# Patient Record
Sex: MALE | Race: BLACK OR AFRICAN AMERICAN | NOT HISPANIC OR LATINO | Employment: UNEMPLOYED | URBAN - METROPOLITAN AREA
[De-identification: names, ages, dates, MRNs, and addresses within clinical notes are randomized per-mention and may not be internally consistent; named-entity substitution may affect disease eponyms.]

---

## 2021-09-28 ENCOUNTER — OFFICE VISIT (OUTPATIENT)
Dept: URGENT CARE | Age: 66
End: 2021-09-28
Payer: COMMERCIAL

## 2021-09-28 VITALS
TEMPERATURE: 97.7 F | DIASTOLIC BLOOD PRESSURE: 94 MMHG | WEIGHT: 235 LBS | HEART RATE: 55 BPM | RESPIRATION RATE: 18 BRPM | BODY MASS INDEX: 30.16 KG/M2 | HEIGHT: 74 IN | OXYGEN SATURATION: 99 % | SYSTOLIC BLOOD PRESSURE: 169 MMHG

## 2021-09-28 DIAGNOSIS — S91.341A: Primary | ICD-10-CM

## 2021-09-28 PROCEDURE — 10120 INC&RMVL FB SUBQ TISS SMPL: CPT | Performed by: NURSE PRACTITIONER

## 2021-09-28 PROCEDURE — G0382 LEV 3 HOSP TYPE B ED VISIT: HCPCS | Performed by: NURSE PRACTITIONER

## 2021-09-28 RX ORDER — QUETIAPINE FUMARATE 25 MG/1
25 TABLET, FILM COATED ORAL
COMMUNITY

## 2021-09-28 RX ORDER — VENLAFAXINE 75 MG/1
75 TABLET ORAL 2 TIMES DAILY
COMMUNITY

## 2021-09-28 RX ORDER — SULFAMETHOXAZOLE AND TRIMETHOPRIM 800; 160 MG/1; MG/1
1 TABLET ORAL EVERY 12 HOURS SCHEDULED
Qty: 14 TABLET | Refills: 0 | Status: SHIPPED | OUTPATIENT
Start: 2021-09-28 | End: 2021-10-05

## 2021-09-28 RX ORDER — DULOXETIN HYDROCHLORIDE 20 MG/1
20 CAPSULE, DELAYED RELEASE ORAL DAILY
COMMUNITY

## 2021-09-28 RX ORDER — CHLORTHALIDONE 25 MG/1
12.5 TABLET ORAL DAILY
COMMUNITY
Start: 2021-04-19 | End: 2022-04-19

## 2021-09-28 RX ORDER — NAPROXEN 375 MG/1
375 TABLET ORAL
COMMUNITY

## 2021-09-28 NOTE — PATIENT INSTRUCTIONS
Acute Wound Care   AMBULATORY CARE:   An acute wound  is an injury that causes a break in the skin  An acute wound can happen suddenly, last a short time, and may heal on its own  Common signs and symptoms of an acute wound:   · A cut, tear, or gash in your skin    · Bleeding, swelling, pain, or trouble moving the affected area    · Dirt or foreign objects inside the wound     · Milky, yellow, green, or brown pus in the wound     · Red, tender, or warm area around the pus    · Fever  Seek care immediately if:   · You have pus or a foul odor coming from the wound  · You have sudden trouble breathing or chest pain  · Blood soaks through your bandage  Contact your healthcare provider if:   · You have muscle, joint, or body aches, sweating, or a fever  · You have more swelling, redness, or bleeding in your wound  · Your skin is itchy, swollen, or you have a rash  · You have questions or concerns about your condition or care  Treatment for an acute wound  may include any of the following:  · Cleansing  is done with soap and water to wash away germs and decrease the risk of infection  Sterile water further cleans the wound  The cleaning is done under high pressure with a catheter tip and large syringe  A solution that kills germs may also be used  · Debridement  is done to clean and remove objects, dirt, or dead tissues from the open wound  Healthcare providers may also drain the wound to clean out pus  · Closure of the wound  is done with stitches, staples, skin adhesive, or other treatments  This may be done if the wound is wide or deep  Stitches may be needed if the wound is in an area that moves a lot, such as the hands, feet, and joints  Stitches may help to keep the wound from getting infected  They may also decrease the amount of scarring you have  Some wounds may heal better without stitches    Wound care:   · If your wound was closed with thin strips of medical tape, keep them clean and Problem: Patient Care Overview  Goal: Plan of Care Review  Outcome: Ongoing (interventions implemented as appropriate)  Pt to go to SNF tomorrow. No reported pain. Pt alerts staff if needing to use the restroom. Sinus/ Sinus bradycardia on the monitor. No noted distress.       dry  The strips of medical tape will fall off on their own  Do not pull them off  · Keep the bandage clean and dry  Do not remove the bandage over your wound unless your healthcare provider says it is okay  · Wash your hands before and after you take care of your wound to prevent infection  · Clean the wound as directed  If you cannot reach the wound, have someone help you  · If you have packing, make sure all the gauze used to pack the wound is taken out and replaced as directed  Keep track of how many gauze dressings are placed inside the wound  Follow up with your healthcare provider as directed:  Write down your questions so you remember to ask them during your visits  © 2016 [employer] is for End User's use only and may not be sold, redistributed or otherwise used for commercial purposes  All illustrations and images included in CareNotes® are the copyrighted property of A D A M [employer]  or Samy Oconnell  The above information is an  only  It is not intended as medical advice for individual conditions or treatments  Talk to your doctor, nurse or pharmacist before following any medical regimen to see if it is safe and effective for you

## 2022-04-06 ENCOUNTER — OFFICE VISIT (OUTPATIENT)
Dept: URGENT CARE | Age: 67
End: 2022-04-06
Payer: COMMERCIAL

## 2022-04-06 VITALS
RESPIRATION RATE: 18 BRPM | TEMPERATURE: 97 F | SYSTOLIC BLOOD PRESSURE: 146 MMHG | HEART RATE: 57 BPM | DIASTOLIC BLOOD PRESSURE: 83 MMHG | OXYGEN SATURATION: 99 %

## 2022-04-06 DIAGNOSIS — T14.8XXA SPLINTER IN SKIN: Primary | ICD-10-CM

## 2022-04-06 PROCEDURE — G0382 LEV 3 HOSP TYPE B ED VISIT: HCPCS

## 2022-04-06 RX ORDER — CEPHALEXIN 500 MG/1
500 CAPSULE ORAL EVERY 6 HOURS SCHEDULED
Qty: 28 CAPSULE | Refills: 0 | Status: SHIPPED | OUTPATIENT
Start: 2022-04-06 | End: 2022-04-13

## 2022-04-06 NOTE — PATIENT INSTRUCTIONS
Abscess   WHAT YOU NEED TO KNOW:   A warm compress may help your abscess drain  Your healthcare provider may make a cut in the abscess so it can drain  You may need surgery to remove an abscess that is on your hands or buttocks  DISCHARGE INSTRUCTIONS:   Return to the emergency department if:   · The area around your abscess becomes very painful, warm, or has red streaks  · You have a fever and chills  · Your heart is beating faster than usual     · You feel faint or confused  Call your doctor if:   · Your abscess gets bigger or does not get better  · Your abscess returns  · You have questions or concerns about your condition or care  Medicines: You may  need any of the following:  · Antibiotics  help treat a bacterial infection  · Acetaminophen  decreases pain and fever  It is available without a doctor's order  Ask how much to take and how often to take it  Follow directions  Read the labels of all other medicines you are using to see if they also contain acetaminophen, or ask your doctor or pharmacist  Acetaminophen can cause liver damage if not taken correctly  Do not use more than 4 grams (4,000 milligrams) total of acetaminophen in one day  · NSAIDs , such as ibuprofen, help decrease swelling, pain, and fever  This medicine is available with or without a doctor's order  NSAIDs can cause stomach bleeding or kidney problems in certain people  If you take blood thinner medicine, always ask your healthcare provider if NSAIDs are safe for you  Always read the medicine label and follow directions  · Take your medicine as directed  Contact your healthcare provider if you think your medicine is not helping or if you have side effects  Tell him or her if you are allergic to any medicine  Keep a list of the medicines, vitamins, and herbs you take  Include the amounts, and when and why you take them  Bring the list or the pill bottles to follow-up visits   Carry your medicine list with you in case of an emergency  Self-care:   · Apply a warm compress to your abscess  This will help it open and drain  Wet a washcloth in warm, but not hot, water  Apply the compress for 10 minutes  Repeat this 4 times each day  Do not  press on an abscess or try to open it with a needle  You may push the bacteria deeper or into your blood  · Do not share your clothes, towels, or sheets with anyone  This can spread the infection to others  · Wash your hands often  This can help prevent the spread of germs  Use soap and water or an alcohol-based hand rub  Care for your wound after it is drained:   · Care for your wound as directed  If your healthcare provider says it is okay, carefully remove the bandage and gauze packing  You may need to soak the gauze to get it out of your wound  Clean your wound and the area around it as directed  Dry the area and put on new, clean bandages  Change your bandages when they get wet or dirty  · Ask your healthcare provider how to change the gauze in your wound  Keep track of how many pieces of gauze are placed inside the wound  Do not put too much packing in the wound  Do not pack the gauze too tightly in your wound  Follow up with your healthcare provider in 1 to 3 days: You may need to have your packing removed or your bandage changed  Write down your questions so you remember to ask them during your visits  © [employer] 2022 Information is for End User's use only and may not be sold, redistributed or otherwise used for commercial purposes  All illustrations and images included in CareNotes® are the copyrighted property of [employer]  or [facility] Marissa Paul   The above information is an  only  It is not intended as medical advice for individual conditions or treatments  Talk to your doctor, nurse or pharmacist before following any medical regimen to see if it is safe and effective for you

## 2022-04-06 NOTE — PROGRESS NOTES
[facility]        NAME: Madi Puckett is a 61 y o  male  : 1955    MRN: [id number]  DATE: 2022  TIME: 9:43 AM    Assessment and Plan   Splinter in skin [T14  8XXA]  1  Splinter in skin  Transfer to other facility    cephalexin (KEFLEX) 500 mg capsule   Imbedded splinter in right palm  With gentle pressure, purulent drainage was able to be expressed, however no splinter was identified  At this point, will refer to emergency department for further evaluation and I&D of abscess if needed  Seven-day course of Keflex ordered  Patient Instructions   Report to [facility] emergency department for further evaluation  Follow up with PCP in 3-5 days  Proceed to  ER if symptoms worsen  Chief Complaint     Chief Complaint   Patient presents with    Foreign Body in Skin     right hand          History of Present Illness       Patient is a 61 y o  male who presents for chief complaint of imbedded splinter in his right palm  No significant PMH  He reports that about 2 days ago he was sanding a banister, and got a splinter in his right hand  He feels it may have broken off at the tip, as he did not see the tip  He assumed it would work its way out but yesterday noticed some swelling of the area and decided to have it evaluated  Denies fever, drainage, numbness/tingling  Tdap is UTD  Review of Systems   Review of Systems   Constitutional: Negative for chills, fatigue and fever  HENT: Negative for ear pain, sinus pressure, sinus pain and sore throat  Eyes: Negative for pain and visual disturbance  Respiratory: Negative for cough and shortness of breath  Cardiovascular: Negative for chest pain and palpitations  Gastrointestinal: Negative for abdominal pain, diarrhea, nausea and vomiting  Endocrine: Negative  Genitourinary: Negative for dysuria and hematuria  Musculoskeletal: Negative for arthralgias and back pain  Skin: Positive for wound   Negative for color change and rash  Right palm   Allergic/Immunologic: Negative  Neurological: Negative for dizziness, seizures, syncope, light-headedness, numbness and headaches  Hematological: Negative  Psychiatric/Behavioral: Negative  All other systems reviewed and are negative  Current Medications       Current Outpatient Medications:     cephalexin (KEFLEX) 500 mg capsule, Take 1 capsule (500 mg total) by mouth every 6 (six) hours for 7 days, Disp: 28 capsule, Rfl: 0    chlorthalidone 25 mg tablet, Take 12 5 mg by mouth daily, Disp: , Rfl:     Cholecalciferol 50 MCG (2000 UT) CAPS, Take 1 capsule by mouth, Disp: , Rfl:     DULoxetine (CYMBALTA) 20 mg capsule, Take 20 mg by mouth daily, Disp: , Rfl:     naproxen (NAPROSYN) 375 mg tablet, Take 375 mg by mouth, Disp: , Rfl:     QUEtiapine (SEROquel) 25 mg tablet, Take 25 mg by mouth, Disp: , Rfl:     venlafaxine (EFFEXOR) 75 mg tablet, Take 75 mg by mouth 2 (two) times a day, Disp: , Rfl:     Current Allergies     Allergies as of 04/06/2022    (No Known Allergies)            The following portions of the patient's history were reviewed and updated as appropriate: allergies, current medications, past family history, past medical history, past social history, past surgical history and problem list      History reviewed  No pertinent past medical history  History reviewed  No pertinent surgical history  No family history on file  Medications have been verified  Objective   /83   Pulse 57   Temp (!) 97 °F (36 1 °C)   Resp 18   SpO2 99%        Physical Exam     Physical Exam  Constitutional:       General: He is not in acute distress  Appearance: Normal appearance  He is not ill-appearing, toxic-appearing or diaphoretic  HENT:      Head: Normocephalic and atraumatic  Nose: Nose normal       Mouth/Throat:      Mouth: Mucous membranes are moist    Eyes:      Extraocular Movements: Extraocular movements intact  Pupils: Pupils are equal, round, and reactive to light  Cardiovascular:      Rate and Rhythm: Normal rate and regular rhythm  Pulmonary:      Effort: Pulmonary effort is normal    Musculoskeletal:         General: Normal range of motion  Right hand: Tenderness present  Normal capillary refill  Normal pulse  Cervical back: Normal range of motion and neck supple  No rigidity or tenderness  Comments: Right radial pulse +2, capillary refill < 2 seconds  Full ROM on exam     Skin:     Capillary Refill: Capillary refill takes less than 2 seconds  Findings: Abscess present  No erythema  Comments: No splinter identified, small 1 cm x 1 cm abscess noted in palm of right hand  Neurological:      General: No focal deficit present  Mental Status: He is alert     Psychiatric:         Mood and Affect: Mood normal

## 2023-08-24 ENCOUNTER — VBI (OUTPATIENT)
Dept: ADMINISTRATIVE | Facility: OTHER | Age: 68
End: 2023-08-24

## 2023-10-25 ENCOUNTER — OFFICE VISIT (OUTPATIENT)
Dept: PAIN MEDICINE | Facility: CLINIC | Age: 68
End: 2023-10-25
Payer: COMMERCIAL

## 2023-10-25 ENCOUNTER — APPOINTMENT (OUTPATIENT)
Dept: RADIOLOGY | Facility: CLINIC | Age: 68
End: 2023-10-25
Payer: COMMERCIAL

## 2023-10-25 ENCOUNTER — TELEPHONE (OUTPATIENT)
Age: 68
End: 2023-10-25

## 2023-10-25 VITALS
BODY MASS INDEX: 28.23 KG/M2 | WEIGHT: 227 LBS | TEMPERATURE: 97 F | HEIGHT: 75 IN | SYSTOLIC BLOOD PRESSURE: 130 MMHG | DIASTOLIC BLOOD PRESSURE: 82 MMHG | HEART RATE: 79 BPM

## 2023-10-25 DIAGNOSIS — M79.641 RIGHT HAND PAIN: Primary | ICD-10-CM
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
## 2023-10-25 DIAGNOSIS — M25.561 ACUTE PAIN OF RIGHT KNEE: ICD-10-CM

## 2023-10-25 DIAGNOSIS — M79.641 RIGHT HAND PAIN: ICD-10-CM

## 2023-10-25 DIAGNOSIS — M25.571 RIGHT ANKLE PAIN, UNSPECIFIED CHRONICITY: ICD-10-CM

## 2023-10-25 DIAGNOSIS — M54.12 CERVICAL RADICULOPATHY: ICD-10-CM

## 2023-10-25 PROBLEM — K40.90 UNILATERAL INGUINAL HERNIA, WITHOUT OBSTRUCTION OR GANGRENE, NOT SPECIFIED AS RECURRENT: Status: ACTIVE | Noted: 2023-10-25

## 2023-10-25 PROBLEM — F41.9 ANXIETY DISORDER, UNSPECIFIED: Status: ACTIVE | Noted: 2023-10-25

## 2023-10-25 PROBLEM — H52.223 REGULAR ASTIGMATISM OF BOTH EYES: Status: ACTIVE | Noted: 2023-10-25

## 2023-10-25 PROBLEM — R26.9 ABNORMAL GAIT DUE TO MUSCLE WEAKNESS: Status: ACTIVE | Noted: 2023-10-25

## 2023-10-25 PROBLEM — M21.70 ACQUIRED UNEQUAL LEG LENGTH: Status: ACTIVE | Noted: 2023-10-25

## 2023-10-25 PROBLEM — G47.33 OBSTRUCTIVE SLEEP APNEA OF ADULT: Status: ACTIVE | Noted: 2023-10-25

## 2023-10-25 PROBLEM — M62.81 ABNORMAL GAIT DUE TO MUSCLE WEAKNESS: Status: ACTIVE | Noted: 2023-10-25

## 2023-10-25 PROBLEM — K46.9 ABDOMINAL HERNIA: Status: ACTIVE | Noted: 2023-10-25

## 2023-10-25 PROBLEM — N28.89 KIDNEY MASS: Status: ACTIVE | Noted: 2023-10-25

## 2023-10-25 PROBLEM — F17.200 NICOTINE DEPENDENCE: Status: ACTIVE | Noted: 2023-10-25

## 2023-10-25 PROBLEM — E78.49 OTHER HYPERLIPIDEMIA: Status: ACTIVE | Noted: 2023-10-13

## 2023-10-25 PROBLEM — J44.9 CHRONIC OBSTRUCTIVE PULMONARY DISEASE (HCC): Status: ACTIVE | Noted: 2023-10-25

## 2023-10-25 PROBLEM — H40.9 GLAUCOMA: Status: ACTIVE | Noted: 2023-10-25

## 2023-10-25 PROBLEM — F32.9 MAJOR DEPRESSIVE DISORDER, SINGLE EPISODE, UNSPECIFIED: Status: ACTIVE | Noted: 2023-10-25

## 2023-10-25 PROCEDURE — 73610 X-RAY EXAM OF ANKLE: CPT

## 2023-10-25 PROCEDURE — 99205 OFFICE O/P NEW HI 60 MIN: CPT | Performed by: ANESTHESIOLOGY

## 2023-10-25 PROCEDURE — 73130 X-RAY EXAM OF HAND: CPT

## 2023-10-25 PROCEDURE — 73564 X-RAY EXAM KNEE 4 OR MORE: CPT

## 2023-10-25 RX ORDER — SILDENAFIL 100 MG/1
100 TABLET, FILM COATED ORAL DAILY PRN
COMMUNITY

## 2023-10-25 RX ORDER — BUPROPION HYDROCHLORIDE 300 MG/1
300 TABLET ORAL DAILY
COMMUNITY

## 2023-10-25 RX ORDER — LATANOPROST 50 UG/ML
1 SOLUTION/ DROPS OPHTHALMIC
COMMUNITY

## 2023-10-25 RX ORDER — ALBUTEROL SULFATE 90 UG/1
2 AEROSOL, METERED RESPIRATORY (INHALATION) EVERY 6 HOURS PRN
COMMUNITY

## 2023-10-25 RX ORDER — ROSUVASTATIN CALCIUM 40 MG/1
40 TABLET, COATED ORAL DAILY
COMMUNITY

## 2023-10-25 RX ORDER — PREDNISONE 10 MG/1
TABLET ORAL
Qty: 36 TABLET | Refills: 0 | Status: SHIPPED | OUTPATIENT
Start: 2023-10-25

## 2023-10-25 RX ORDER — SENNOSIDES 8.6 MG
650 CAPSULE ORAL EVERY 8 HOURS PRN
COMMUNITY

## 2023-10-25 RX ORDER — ATORVASTATIN CALCIUM 80 MG/1
80 TABLET, FILM COATED ORAL DAILY
COMMUNITY

## 2023-10-25 RX ORDER — VIT C/B6/B5/MAGNESIUM/HERB 173 50-5-6-5MG
CAPSULE ORAL
COMMUNITY

## 2023-10-25 RX ORDER — BRIMONIDINE TARTRATE 2 MG/ML
1 SOLUTION/ DROPS OPHTHALMIC 3 TIMES DAILY
COMMUNITY

## 2023-10-25 RX ORDER — MULTIVIT-MIN/IRON FUM/FOLIC AC 7.5 MG-4
1 TABLET ORAL DAILY
COMMUNITY

## 2023-10-25 NOTE — TELEPHONE ENCOUNTER
Caller: Leilani     Doctor: Dr Joe Lopez    Reason for call: [facility] calling back asking to speak to [address] [place] [address] desk regarding patient     Call back#: [phone]
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
## 2023-10-31 ENCOUNTER — OFFICE VISIT (OUTPATIENT)
Dept: OBGYN CLINIC | Facility: CLINIC | Age: 68
End: 2023-10-31
Payer: COMMERCIAL

## 2023-10-31 VITALS
WEIGHT: 227 LBS | DIASTOLIC BLOOD PRESSURE: 90 MMHG | SYSTOLIC BLOOD PRESSURE: 140 MMHG | BODY MASS INDEX: 28.23 KG/M2 | HEIGHT: 75 IN

## 2023-10-31 DIAGNOSIS — M19.041 PRIMARY OSTEOARTHRITIS OF RIGHT HAND: Primary | ICD-10-CM

## 2023-10-31 DIAGNOSIS — M79.641 RIGHT HAND PAIN: ICD-10-CM

## 2023-10-31 PROCEDURE — 99213 OFFICE O/P EST LOW 20 MIN: CPT | Performed by: ORTHOPAEDIC SURGERY
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
## 2023-11-06 ENCOUNTER — TELEPHONE (OUTPATIENT)
Dept: VASCULAR SURGERY | Facility: CLINIC | Age: 68
End: 2023-11-06

## 2023-11-06 NOTE — TELEPHONE ENCOUNTER
Called pt to see if he is using his VA ins or private ins. If VA he needs to get referral from primary doctor.

## 2023-11-09 ENCOUNTER — EVALUATION (OUTPATIENT)
Dept: OCCUPATIONAL THERAPY | Age: 68
End: 2023-11-09
Payer: COMMERCIAL

## 2023-11-09 DIAGNOSIS — M19.041 PRIMARY OSTEOARTHRITIS OF RIGHT HAND: Primary | ICD-10-CM

## 2023-11-09 PROCEDURE — 97165 OT EVAL LOW COMPLEX 30 MIN: CPT

## 2023-11-09 PROCEDURE — 97110 THERAPEUTIC EXERCISES: CPT
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
## 2023-11-10 ENCOUNTER — TELEPHONE (OUTPATIENT)
Dept: PAIN MEDICINE | Facility: CLINIC | Age: 68
End: 2023-11-10

## 2023-11-10 ENCOUNTER — OFFICE VISIT (OUTPATIENT)
Dept: OBGYN CLINIC | Facility: CLINIC | Age: 68
End: 2023-11-10
Payer: COMMERCIAL

## 2023-11-10 ENCOUNTER — TELEPHONE (OUTPATIENT)
Age: 68
End: 2023-11-10

## 2023-11-10 VITALS
HEIGHT: 75 IN | SYSTOLIC BLOOD PRESSURE: 140 MMHG | WEIGHT: 224 LBS | DIASTOLIC BLOOD PRESSURE: 90 MMHG | BODY MASS INDEX: 27.85 KG/M2

## 2023-11-10 DIAGNOSIS — M25.571 RIGHT ANKLE PAIN, UNSPECIFIED CHRONICITY: ICD-10-CM

## 2023-11-10 DIAGNOSIS — S93.491S SPRAIN OF ANTERIOR TALOFIBULAR LIGAMENT OF RIGHT ANKLE, SEQUELA: Primary | ICD-10-CM

## 2023-11-10 PROBLEM — S93.491A SPRAIN OF ANTERIOR TALOFIBULAR LIGAMENT OF RIGHT ANKLE: Status: ACTIVE | Noted: 2023-11-10

## 2023-11-10 PROCEDURE — 99213 OFFICE O/P EST LOW 20 MIN: CPT | Performed by: ORTHOPAEDIC SURGERY

## 2023-11-10 NOTE — TELEPHONE ENCOUNTER
Caller: Joe Leblanc    Doctor: Linda Orr    Reason for call: [address], [address]   Phone number: [phone] Dr. Geovanna Thakur was the Doctor that seen Pt.      Call back#: [phone]

## 2023-11-10 NOTE — TELEPHONE ENCOUNTER
Caller: Patient     Doctor: Dr. Clara Mandujano     Reason for call: Patient calling stating that he just saw Dr. Clara Mandujano for the right ankle and he forgot to ask about pain medication. Patient is wondering if anything can be prescribed for his pain. Patient asking for call back from clinical team if something can be prescribed.     Call back#: [phone]

## 2023-11-10 NOTE — TELEPHONE ENCOUNTER
LVM to return call     What [facility] location did Pt have CT lumar & CT Cervical?    Does Pt have a contact number?

## 2023-11-13 NOTE — TELEPHONE ENCOUNTER
Attempted to call patient twice, phone was answered and when I said Hello, call ended. Will attempt to try again later.

## 2023-11-14 NOTE — TELEPHONE ENCOUNTER
Called and spoke with the pt and relayed ADELA Orr, advised he could take 2 tablets every 8 hours and do not exceed 3000 mg in a 24 hour period, pt understands advisement and no further questions

## 2023-11-14 NOTE — TELEPHONE ENCOUNTER
Caller: Patient    Doctor: Lachman    Reason for call: Pt returning nurses phone call. . Pt did not want to wait on hold while reached out, requesting the nurse just give him a call right back.     Call back#: [phone]

## 2023-11-15 ENCOUNTER — TELEPHONE (OUTPATIENT)
Dept: VASCULAR SURGERY | Facility: CLINIC | Age: 68
End: 2023-11-15

## 2023-11-16 ENCOUNTER — OFFICE VISIT (OUTPATIENT)
Dept: OCCUPATIONAL THERAPY | Age: 68
End: 2023-11-16
Payer: COMMERCIAL

## 2023-11-16 ENCOUNTER — TELEPHONE (OUTPATIENT)
Dept: RADIOLOGY | Facility: CLINIC | Age: 68
End: 2023-11-16

## 2023-11-16 DIAGNOSIS — M19.041 PRIMARY OSTEOARTHRITIS OF RIGHT HAND: Primary | ICD-10-CM

## 2023-11-16 PROCEDURE — 97110 THERAPEUTIC EXERCISES: CPT

## 2023-11-16 PROCEDURE — 97140 MANUAL THERAPY 1/> REGIONS: CPT

## 2023-11-16 NOTE — PROGRESS NOTES
Daily Note     Today's date: 2023  Patient name: Erwin Zhu  : 1955  MRN: [id number]  Referring provider: Paola Killian,*  Dx:   Encounter Diagnosis     ICD-10-CM    1. Primary osteoarthritis of right hand  M19.041           Start Time: 1106  Stop Time: 1141  Total time in clinic (min): 35 minutes    Subjective: "I got the wax machine."      Objective: See treatment diary below      Assessment: Tolerated treatment well. Reviewed all AROM HEPs and educated on paraffin application, joint protection, activity modification. Pt notices improvement from today's session. Requires min cues for positioning during exercises. Patient would benefit from continued OT      Plan: Continue per plan of care. Progress treatment as tolerated.        Precautions:       Manuals            STM  10                                     Ther Ex HEPs            Wrist AROM  x20           Prayer stretch  3x30s           Tendon glides  X20 each           Ring/Small MP Blocking  X20 each           Digit op  x20                                     Static  strength                                                                                           Ther Activity             Joint protection             Act mod             Pacing             Adaptive equipment                          Orthotic fit             Night time PIP/DIP orthosis PRN                          Modalities             HP + Paraffin HP 5m

## 2023-11-16 NOTE — TELEPHONE ENCOUNTER
provider OOO Called and LMOM to inform patient that appt is cancelled and needs to call back to reschedule appt.    Patient can be rescheduled with:    Asya Tian on 11-29-23   Emily Kaba 12-1-23       [id number] [address] [facility]

## 2023-11-21 ENCOUNTER — TELEPHONE (OUTPATIENT)
Age: 68
End: 2023-11-21

## 2023-11-21 ENCOUNTER — APPOINTMENT (OUTPATIENT)
Dept: OCCUPATIONAL THERAPY | Age: 68
End: 2023-11-21
Payer: COMMERCIAL

## 2023-11-21 NOTE — TELEPHONE ENCOUNTER
Caller: Patient     Doctor: Orlin Miranda     Reason for call: Calling to reschedule apt , placed on brief hold to look at scheduled and caller ended call     Call back#: n/a

## 2023-11-27 ENCOUNTER — EVALUATION (OUTPATIENT)
Dept: PHYSICAL THERAPY | Age: 68
End: 2023-11-27
Payer: COMMERCIAL

## 2023-11-27 DIAGNOSIS — M25.571 RIGHT ANKLE PAIN, UNSPECIFIED CHRONICITY: ICD-10-CM

## 2023-11-27 PROCEDURE — 97161 PT EVAL LOW COMPLEX 20 MIN: CPT | Performed by: PHYSICAL THERAPIST

## 2023-11-27 PROCEDURE — 97110 THERAPEUTIC EXERCISES: CPT | Performed by: PHYSICAL THERAPIST
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
## 2023-11-28 ENCOUNTER — OFFICE VISIT (OUTPATIENT)
Dept: OCCUPATIONAL THERAPY | Age: 68
End: 2023-11-28
Payer: COMMERCIAL

## 2023-11-28 DIAGNOSIS — M19.041 PRIMARY OSTEOARTHRITIS OF RIGHT HAND: Primary | ICD-10-CM

## 2023-11-28 PROCEDURE — 97140 MANUAL THERAPY 1/> REGIONS: CPT

## 2023-11-28 PROCEDURE — 97110 THERAPEUTIC EXERCISES: CPT

## 2023-11-28 NOTE — PROGRESS NOTES
OT Discharge     Today's date: 2023  Patient name: Pablito Lopez  : 1955  MRN: [id number]  Referring provider: Loy Barriga,*  Dx:   Encounter Diagnosis     ICD-10-CM    1. Primary osteoarthritis of right hand  M19.041             Start Time: 0900  Stop Time: 0930  Total time in clinic (min): 30 minutes    Subjective: "Feeling good. I got a wax machine. I'm ready for today to be my last day."      Objective: See treatment diary below    Reviewed all HEPs, modalities, and symptom management strategies        Assessment: Tolerated treatment well. Pt independent with HEPs. He feels confident transitioning to HEP at this time. Recommend discharge from OT. Goals  Short term goals: To be achieved within 2-4 visits from start of care on 23     Patient will demonstrate independence with recommended AROM and stretching HEPs. Patient will be instructed on the benefits and effective use of appropriate modalities for edema control, soft-tissue extensibility, and pain management. Patient will be instructed on the benefits and effective use of appropriate adaptive equipment and/or bracing devices. Patient will be report improved functional participation with implementation of recommended pacing, activity modifications, and symptom management strategies. Patient will demonstrate improved activity tolerance and strength to [facility] for increased readiness for yard work and other leisure occupations. Patient will report readiness for d/c from OT. ALL GOALS MET      Plan: Discharge from OT.      Precautions:       Manuals           STM  10 10                                    Ther Ex HEPs            Wrist AROM  x20 x20          Prayer stretch  3x30s 3x30s          Tendon glides  X20 each X20 each          Ring/Small MP Blocking  X20 each X20 each          Digit op  x20 x20                                    Static  strength Ther Activity             Joint protection             Act mod             Pacing             Adaptive equipment                          Orthotic fit             Night time PIP/DIP orthosis PRN                          Modalities             HP + Paraffin HP 5m  5m

## 2023-11-29 ENCOUNTER — OFFICE VISIT (OUTPATIENT)
Dept: PHYSICAL THERAPY | Age: 68
End: 2023-11-29
Payer: COMMERCIAL

## 2023-11-29 DIAGNOSIS — M25.571 RIGHT ANKLE PAIN, UNSPECIFIED CHRONICITY: Primary | ICD-10-CM

## 2023-11-29 PROCEDURE — 97110 THERAPEUTIC EXERCISES: CPT

## 2023-11-29 NOTE — PROGRESS NOTES
Daily Note     Today's date: 2023  Patient name: Musa Begum  : 1955  MRN: [id number]  Referring provider: Jarrett Duffy  Dx: No diagnosis found. Subjective: pt reports continued ankle soreness but notes some improvement after IE. He remembered how to do the one stretch given to him but couldn't remember all of the ones with the resistance band. Objective: See treatment diary below      Assessment: Pt performed warm up on Nu step with resistance for LE strengthening and cardiovascular endurance. Reviewed HEP exercises with patient. He required cueing for proper form especially for inversion and eversion. He was able to actively engage dorsiflexors b/l with light resistance. He would benefit from advancement of LE strengthening in f/u visits. Tolerated treatment well. Patient demonstrated fatigue post treatment, exhibited good technique with therapeutic exercises, and would benefit from continued PT      Plan: Continue per plan of care. Progress treatment as tolerated.        Precautions: L TKA, R THR, Hx of R ankle fx    Manuals            Ankle PROM                                                    Neuro Re-Ed             Tandem ambulation              Side steps              Marching on foam                                                                 Ther Ex             Pt education on HEP, POC 10 min            Nu step for LE strengthening and ROM  Nu step L3 10'           Long sitting calf stretch c strap 3x30s L/R 30"x3 ea           Ankle TB 4 way  YTB x15 ea (DF no TB) YTB 3x10 ea b/l           Bridges  2x5 nv           SLR flexion   nv           Clamshells L/R             Standing hip 3 way   nv           Standing HR + TR if able   nv           Ther Activity             STS c airex   nv           Stepping up lat  nv           Stepping up fw  nv           Gait Training                                       Modalities

## 2023-12-01 ENCOUNTER — TELEPHONE (OUTPATIENT)
Age: 68
End: 2023-12-01

## 2023-12-01 NOTE — TELEPHONE ENCOUNTER
Caller: Kathryn López pt    Doctor: Zeke Braga    Reason for call: pt called to cancel appt for today because his car broke down.   He asked if you can send him a reminder letter for his r/s appt    Call back#: [phone]

## 2023-12-05 ENCOUNTER — OFFICE VISIT (OUTPATIENT)
Dept: PHYSICAL THERAPY | Age: 68
End: 2023-12-05
Payer: COMMERCIAL

## 2023-12-05 DIAGNOSIS — M25.571 RIGHT ANKLE PAIN, UNSPECIFIED CHRONICITY: Primary | ICD-10-CM

## 2023-12-05 PROCEDURE — 97110 THERAPEUTIC EXERCISES: CPT

## 2023-12-05 PROCEDURE — 97530 THERAPEUTIC ACTIVITIES: CPT

## 2023-12-05 NOTE — PROGRESS NOTES
Daily Note     Today's date: 2023  Patient name: Lisbeth Krishna  : 1955  MRN: [id number]  Referring provider: Jovita Davis  Dx:   Encounter Diagnosis     ICD-10-CM    1. Right ankle pain, unspecified chronicity  M25.571                      Subjective: Pt reports he is "doing better."       Objective: See treatment diary below      Assessment: Pt continues to demonstrate b/l ankle weakness R being weaker than L. Increased resistance for ankle 4 way with TB. Pt fatigued on R ankle especially with inversion and eversion. Able to incorporate closed chain strengthening of LE's as indicated below without adverse symptom response. Tolerated treatment well. Patient demonstrated fatigue post treatment, exhibited good technique with therapeutic exercises, and would benefit from continued PT      Plan: Continue per plan of care. Progress treatment as tolerated.        Precautions: L TKA, R THR, Hx of R ankle fx    Manuals           Ankle PROM                                                    Neuro Re-Ed             Tandem ambulation              Side steps              Marching on foam                                                                 Ther Ex             Pt education on HEP, POC 10 min            Nu step for LE strengthening and ROM  Nu step L3 10' Nu step L3 10'           Long sitting calf stretch c strap 3x30s L/R 30"x3 ea 30"x3          Ankle TB 4 way  YTB x15 ea (DF no TB) YTB 3x10 ea b/l RTB 3x10 ea b/l           Bridges  2x5 nv           SLR flexion   nv           Clamshells L/R             Standing hip 3 way   nv           Standing HR + TR if able   nv Nv*          Cybex leg press             Cybex LAQ             Cybex hip abd             Cybex ham curl              Ther Activity             STS c airex   nv 3x10          Stepping up lat  nv *          Stepping up fw  nv 2x10 b/l          Sled push   3 laps          Gait Training Modalities

## 2023-12-08 ENCOUNTER — OFFICE VISIT (OUTPATIENT)
Dept: PHYSICAL THERAPY | Age: 68
End: 2023-12-08
Payer: COMMERCIAL

## 2023-12-08 DIAGNOSIS — M25.571 RIGHT ANKLE PAIN, UNSPECIFIED CHRONICITY: Primary | ICD-10-CM

## 2023-12-08 PROCEDURE — 97110 THERAPEUTIC EXERCISES: CPT

## 2023-12-08 PROCEDURE — 97530 THERAPEUTIC ACTIVITIES: CPT

## 2023-12-08 NOTE — PROGRESS NOTES
Daily Note     Today's date: 2023  Patient name: Surekha Sanchez  : 1955  MRN: [id number]  Referring provider: Brenda Moore  Dx:   Encounter Diagnosis     ICD-10-CM    1. Right ankle pain, unspecified chronicity  M25.571                      Subjective: Pt reports he is doing better than yesterday. He feels like he is getting stronger and he hasn't fallen since he started PT. Objective: See treatment diary below      Assessment: Pt with improving ankle strength as he is able to tolerate increased resistance without significant fatigue. Provided pt with red TB for home to progress strength. Challenged by tandem ambulation due to decreased dynamic stability of b/l ankles. Required intermittent use of Ue's for stability. He would benefit from continued OP PT in order to progress ankle stability, LE strength, and safety with ambulation. Tolerated treatment well. Patient demonstrated fatigue post treatment, exhibited good technique with therapeutic exercises, and would benefit from continued PT      Plan: Continue per plan of care. Progress treatment as tolerated.        Precautions: L TKA, R THR, Hx of R ankle fx    Manuals          Ankle PROM                                                    Neuro Re-Ed             Tandem ambulation     3 laps         Side steps              Marching on foam                                                                 Ther Ex             Pt education on HEP, POC 10 min            Nu step for LE strengthening and ROM  Nu step L3 10' Nu step L3 10'  Nu step L3 10'         Long sitting calf stretch c strap 3x30s L/R 30"x3 ea 30"x3 30"x3         Ankle TB 4 way  YTB x15 ea (DF no TB) YTB 3x10 ea b/l RTB 3x10 ea b/l  RTB 3x10 ea b/l         Bridges  2x5 nv           SLR flexion   nv           Clamshells L/R             Standing hip 3 way   nv           Standing HR + TR if able   nv Nv* 3x10 ea         Cybex leg press             Cybex LAQ Cybex hip abd             Cybex ham curl              Ther Activity             STS c airex   nv 3x10 3x10          Stepping up lat  nv           Stepping up fw  nv 2x10 b/l 2x10 b/l 6"         Sled push   3 laps 3 laps 25#         Gait Training                                       Modalities

## 2023-12-12 ENCOUNTER — OFFICE VISIT (OUTPATIENT)
Dept: PHYSICAL THERAPY | Age: 68
End: 2023-12-12
Payer: COMMERCIAL

## 2023-12-12 DIAGNOSIS — M25.571 RIGHT ANKLE PAIN, UNSPECIFIED CHRONICITY: Primary | ICD-10-CM

## 2023-12-12 PROCEDURE — 97530 THERAPEUTIC ACTIVITIES: CPT

## 2023-12-12 PROCEDURE — 97110 THERAPEUTIC EXERCISES: CPT

## 2023-12-12 NOTE — PROGRESS NOTES
Daily Note     Today's date: 2023  Patient name: Lisbeth Krishna  : 1955  MRN: [id number]  Referring provider: Jovita Davis  Dx:   Encounter Diagnosis     ICD-10-CM    1. Right ankle pain, unspecified chronicity  M25.571                      Subjective: Pt states that he is feeling better than when he started. Legs are feeling stronger. He was able to maintain his balance when stepping on a stone over the weekend. Objective: See treatment diary below      Assessment: Pt continues to demonstrated L ankle weakness and gross LE weakness as he fatigues with completion of light strengthening exercises. He is progressing as he does not require use of AD at this time to maintain stability while ambulating. Tolerated treatment well. Patient demonstrated fatigue post treatment, exhibited good technique with therapeutic exercises, and would benefit from continued PT      Plan: Continue per plan of care. Progress treatment as tolerated.        Precautions: L TKA, R THR, Hx of R ankle fx    Manuals         Ankle PROM                                                    Neuro Re-Ed             Tandem ambulation     3 laps 3 laps        Side steps              Marching on foam                                                                 Ther Ex             Pt education on HEP, POC 10 min            Nu step for LE strengthening and ROM  Nu step L3 10' Nu step L3 10'  Nu step L3 10' Nu step L4 10'         Long sitting calf stretch c strap 3x30s L/R 30"x3 ea 30"x3 30"x3 30"x3        Ankle TB 4 way  YTB x15 ea (DF no TB) YTB 3x10 ea b/l RTB 3x10 ea b/l  RTB 3x10 ea b/l RTB 3x10 ea b/l        Bridges  2x5 nv           SLR flexion   nv           Clamshells L/R             Standing hip 3 way   nv           Standing HR + TR if able   nv Nv* 3x10 ea 3x10 ea        Cybex leg press             Cybex LAQ             Cybex hip abd             Cybex ham curl              Ther Activity STS c airex   nv 3x10 3x10  3x10        Stepping up lat  nv           Stepping up fw  nv 2x10 b/l 2x10 b/l 6" 2x10 6" b/l        Sled push   3 laps 3 laps 25# 3 laps 25#        Gait Training                                       Modalities

## 2023-12-15 ENCOUNTER — OFFICE VISIT (OUTPATIENT)
Dept: PHYSICAL THERAPY | Age: 68
End: 2023-12-15
Payer: COMMERCIAL

## 2023-12-15 DIAGNOSIS — M25.571 RIGHT ANKLE PAIN, UNSPECIFIED CHRONICITY: Primary | ICD-10-CM

## 2023-12-15 PROCEDURE — 97110 THERAPEUTIC EXERCISES: CPT

## 2023-12-15 PROCEDURE — 97112 NEUROMUSCULAR REEDUCATION: CPT

## 2023-12-15 PROCEDURE — 97530 THERAPEUTIC ACTIVITIES: CPT

## 2023-12-15 NOTE — PROGRESS NOTES
Daily Note     Today's date: 12/15/2023  Patient name: Erwin Zhu  : 1955  MRN: [id number]  Referring provider: Bria Kay  Dx:   Encounter Diagnosis     ICD-10-CM    1. Right ankle pain, unspecified chronicity  M25.571                      Subjective: Pt reports continued improvement in balance and LE strength. Objective: See treatment diary below      Assessment: Pt was challenged by tandem balance on foam as he demonstrated moderate sway and required intermittent use of UE to prevent LOB. Better performance on tandem walking on firm surface as he did not require UE assistance. Continues to exhibit weakness in b/l ankles with resistance training. Tolerated treatment well. Patient demonstrated fatigue post treatment, exhibited good technique with therapeutic exercises, and would benefit from continued PT      Plan: Continue per plan of care. Progress treatment as tolerated.        Precautions: L TKA, R THR, Hx of R ankle fx    Manuals 11/27 11/29 12/5 12/8 12/12 12/15       Ankle PROM                                                    Neuro Re-Ed             Tandem ambulation     3 laps 3 laps 3 laps       Side steps              Marching on foam             Tandem static on foam      30"x4                                              Ther Ex             Pt education on HEP, POC 10 min            Nu step for LE strengthening and ROM  Nu step L3 10' Nu step L3 10'  Nu step L3 10' Nu step L4 10'  Nu step L4 10'       Long sitting calf stretch c strap 3x30s L/R 30"x3 ea 30"x3 30"x3 30"x3 30"x3       Ankle TB 4 way  YTB x15 ea (DF no TB) YTB 3x10 ea b/l RTB 3x10 ea b/l  RTB 3x10 ea b/l RTB 3x10 ea b/l RTB 3x10 ea b/l        Bridges  2x5 nv           SLR flexion   nv           Clamshells L/R             Standing hip 3 way   nv           Standing HR + TR if able   nv Nv* 3x10 ea 3x10 ea nv       Cybex leg press             Cybex LAQ             Cybex hip abd             Cybex ham curl Ther Activity             STS c airex   nv 3x10 3x10  3x10 3x10       Stepping up lat  nv           Stepping up fw  nv 2x10 b/l 2x10 b/l 6" 2x10 6" b/l 2x10 8" b/l       Sled push   3 laps 3 laps 25# 3 laps 25# 3 las 25#       Gait Training                                       Modalities

## 2023-12-19 ENCOUNTER — OFFICE VISIT (OUTPATIENT)
Dept: PHYSICAL THERAPY | Age: 68
End: 2023-12-19
Payer: COMMERCIAL

## 2023-12-19 DIAGNOSIS — M25.571 RIGHT ANKLE PAIN, UNSPECIFIED CHRONICITY: Primary | ICD-10-CM

## 2023-12-19 PROCEDURE — 97110 THERAPEUTIC EXERCISES: CPT

## 2023-12-19 PROCEDURE — 97530 THERAPEUTIC ACTIVITIES: CPT

## 2023-12-19 PROCEDURE — 97112 NEUROMUSCULAR REEDUCATION: CPT

## 2023-12-19 NOTE — PROGRESS NOTES
"Daily Note     Today's date: 2023  Patient name: Raheel Atwood  : 1955  MRN: [id number]  Referring provider: Mansoor Arreola*  Dx:   Encounter Diagnosis     ICD-10-CM    1. Right ankle pain, unspecified chronicity  M25.571                      Subjective: Pt reports he is doing well.       Objective: See treatment diary below      Assessment: Pt with improving ankle strength as he tolerated strengthening with resistance band well. Difficulty with tandem balance on compliant surface as his dynamic stability is lacking. He would benefit from continued OP PT in order to progress dynamic balance and LE strength. Tolerated treatment well. Patient demonstrated fatigue post treatment, exhibited good technique with therapeutic exercises, and would benefit from continued PT      Plan: Continue per plan of care.  Progress treatment as tolerated.       Precautions: L TKA, R THR, Hx of R ankle fx    Manuals 11/27 11/29 12/5 12/8 12/12 12/15 12/19      Ankle PROM                                                    Neuro Re-Ed             Tandem ambulation     3 laps 3 laps 3 laps 6 laps      Side steps              Marching on foam             Tandem static on foam      30\"x4 30\"x4                                             Ther Ex             Pt education on HEP, POC 10 min            Nu step for LE strengthening and ROM  Nu step L3 10' Nu step L3 10'  Nu step L3 10' Nu step L4 10'  Nu step L4 10' Nu step L4 10'      Long sitting calf stretch c strap 3x30s L/R 30\"x3 ea 30\"x3 30\"x3 30\"x3 30\"x3 30\"x3      Ankle TB 4 way  YTB x15 ea (DF no TB) YTB 3x10 ea b/l RTB 3x10 ea b/l  RTB 3x10 ea b/l RTB 3x10 ea b/l RTB 3x10 ea b/l  RTB 3x10 ea b/l       Bridges  2x5 nv           SLR flexion   nv           Clamshells L/R             Standing hip 3 way   nv           Standing HR + TR if able   nv Nv* 3x10 ea 3x10 ea nv 3x10 ea      Cybex leg press       NV?      Cybex LAQ             Cybex hip abd             Cybex ham " "curl              Ther Activity             STS c airex   nv 3x10 3x10  3x10 3x10 3x10       Stepping up lat  nv           Stepping up fw  nv 2x10 b/l 2x10 b/l 6\" 2x10 6\" b/l 2x10 8\" b/l 2x10 8\" b/l      Sled push   3 laps 3 laps 25# 3 laps 25# 3 laps 25# 6 laps 25#      Gait Training                                       Modalities                                                          "

## 2023-12-21 ENCOUNTER — OFFICE VISIT (OUTPATIENT)
Dept: PHYSICAL THERAPY | Age: 68
End: 2023-12-21
Payer: COMMERCIAL

## 2023-12-21 DIAGNOSIS — M25.571 RIGHT ANKLE PAIN, UNSPECIFIED CHRONICITY: Primary | ICD-10-CM

## 2023-12-21 PROCEDURE — 97110 THERAPEUTIC EXERCISES: CPT

## 2023-12-21 NOTE — PROGRESS NOTES
"Daily Note     Today's date: 2023  Patient name: Raheel Atwood  : 1955  MRN: [id number]  Referring provider: Mansoor Arreola*  Dx:   Encounter Diagnosis     ICD-10-CM    1. Right ankle pain, unspecified chronicity  M25.571                      Subjective: Pt states he has another therapy at 8:30 so he only wants to do the bike and then has to leave.       Objective: See treatment diary below      Assessment: Pt arrived 20 minutes late to appointment but was accommodated. Pt performed the Nu step and sled push for LE strengthening and endurance before having to leave for another appointment. May progress next visit as tolerated. Tolerated treatment well. Patient demonstrated fatigue post treatment, exhibited good technique with therapeutic exercises, and would benefit from continued PT      Plan: Continue per plan of care.  Progress treatment as tolerated.       Precautions: L TKA, R THR, Hx of R ankle fx    Manuals 11/27 11/29 12/5 12/8 12/12 12/15 12/19 12/21     Ankle PROM                                                    Neuro Re-Ed             Tandem ambulation     3 laps 3 laps 3 laps 6 laps      Side steps              Marching on foam             Tandem static on foam      30\"x4 30\"x4                                             Ther Ex             Pt education on HEP, POC 10 min            Nu step for LE strengthening and ROM  Nu step L3 10' Nu step L3 10'  Nu step L3 10' Nu step L4 10'  Nu step L4 10' Nu step L4 10' Nu step L4 15'      Long sitting calf stretch c strap 3x30s L/R 30\"x3 ea 30\"x3 30\"x3 30\"x3 30\"x3 30\"x3      Ankle TB 4 way  YTB x15 ea (DF no TB) YTB 3x10 ea b/l RTB 3x10 ea b/l  RTB 3x10 ea b/l RTB 3x10 ea b/l RTB 3x10 ea b/l  RTB 3x10 ea b/l       Bridges  2x5 nv           SLR flexion   nv           Clamshells L/R             Standing hip 3 way   nv           Standing HR + TR if able   nv Nv* 3x10 ea 3x10 ea nv 3x10 ea      Cybex leg press       NV?      Cybex LAQ         " "    Cybex hip abd             Cybex ham curl              Ther Activity             STS c airex   nv 3x10 3x10  3x10 3x10 3x10       Stepping up lat  nv           Stepping up fw  nv 2x10 b/l 2x10 b/l 6\" 2x10 6\" b/l 2x10 8\" b/l 2x10 8\" b/l      Sled push   3 laps 3 laps 25# 3 laps 25# 3 laps 25# 6 laps 25# 6 laps 25#     Gait Training                                       Modalities                                                            "

## 2023-12-29 ENCOUNTER — OFFICE VISIT (OUTPATIENT)
Dept: OBGYN CLINIC | Facility: CLINIC | Age: 68
End: 2023-12-29
Payer: COMMERCIAL

## 2023-12-29 VITALS
DIASTOLIC BLOOD PRESSURE: 84 MMHG | HEART RATE: 79 BPM | HEIGHT: 75 IN | WEIGHT: 224 LBS | BODY MASS INDEX: 27.85 KG/M2 | RESPIRATION RATE: 18 BRPM | SYSTOLIC BLOOD PRESSURE: 123 MMHG

## 2023-12-29 DIAGNOSIS — Z01.89 ENCOUNTER FOR LOWER EXTREMITY COMPARISON IMAGING STUDY: ICD-10-CM

## 2023-12-29 DIAGNOSIS — M94.9 OSTEOCHONDRAL LESION OF TALAR DOME: Primary | ICD-10-CM

## 2023-12-29 DIAGNOSIS — M89.9 OSTEOCHONDRAL LESION OF TALAR DOME: Primary | ICD-10-CM

## 2023-12-29 PROCEDURE — 99213 OFFICE O/P EST LOW 20 MIN: CPT | Performed by: ORTHOPAEDIC SURGERY

## 2023-12-29 RX ORDER — CELECOXIB 200 MG/1
CAPSULE ORAL
COMMUNITY
Start: 2023-10-11

## 2023-12-29 NOTE — PROGRESS NOTES
James R Lachman, M.D.  Attending, Orthopaedic Surgery  Foot and Ankle  [facility]      ORTHOPAEDIC FOOT AND ANKLE CLINIC VISIT     Assessment:     Encounter Diagnoses   Name Primary?    Osteochondral lesion of talar dome Yes    Encounter for lower extremity comparison imaging study             Plan:   The patient verbalized understanding of exam findings and treatment plan. We engaged in the shared decision-making process and treatment options were discussed at length with the patient. Surgical and conservative management discussed today along with risks and benefits.  Patient has not noticed any relief of his pain/symptoms about his right ankle since beginning PT/HEP. At this time, we will order an MRI to further evaluate for a possible osteochondral lesion of his talar dome or other ligamentous/tendon abnormalities  Explained to the patient that the MRI report may read an injury to the syndesmosis but this is chronic and related to his old fracture 20 years ago.   Continue home exercise program as instructed.   Continue with ice, elevation and compression stocking for swelling control   Return for review of MRI results.      History of Present Illness:   Chief Complaint:   Chief Complaint   Patient presents with    Right Ankle - Follow-up     Raheel Atwood is a 68 y.o. male who is being seen in follow-up for Right ankle pain. Patient states he had a  accident 20 years ago and fractured his ankle but it was treated non-operatively.  When we last saw he we recommended PT/HEP for an under rehabilitated ankle.  Pain has not improved. Residual pain is localized at anterolateral ankle with minimal radiating and described as sharp and severe.      Pain/symptom timing:  Worse during the day when active  Pain/symptom context:  Worse with activites and work  Pain/symptom modifying factors:  Rest makes better, activities make worse  Pain/symptom associated signs/symptoms: none    Prior  "treatment   NSAIDsYes   Injections No   Bracing/Orthotics Yes    Physical Therapy Yes     Orthopedic Surgical History:   See below    Past Medical, Surgical and Social History:  Past Medical History:  has a past medical history of Anxiety.  Problem List: does not have any pertinent problems on file.  Past Surgical History:  has a past surgical history that includes Hernia repair.  Family History: family history is not on file.  Social History:  reports that he has been smoking cigarettes. He has never used smokeless tobacco. He reports current alcohol use. He reports current drug use. Drug: Marijuana.  Current Medications: has a current medication list which includes the following prescription(s): acetaminophen, albuterol, aspirin, atorvastatin, benzoyl peroxide, brimonidine tartrate, brinzolamide-brimonidine, bupropion, celecoxib, cholecalciferol, diclofenac sodium, duloxetine, latanoprost, multivitamin with minerals, naproxen, fish oil, quetiapine, rosuvastatin, sildenafil, turmeric, venlafaxine, vitamin e (tocopherol), chlorthalidone, and prednisone.  Allergies: is allergic to benzodiazepines.     Review of Systems:  General- denies fever/chills  HEENT- denies hearing loss or sore throat  Eyes- denies eye pain or visual disturbances, denies red eyes  Respiratory- denies cough or SOB  Cardio- denies chest pain or palpitations  GI- denies abdominal pain  Endocrine- denies urinary frequency  Urinary- denies pain with urination  Musculoskeletal- Negative except noted above  Skin- denies rashes or wounds  Neurological- denies dizziness or headache  Psychiatric- denies anxiety or difficulty concentrating    Physical Exam:   /84 (BP Location: Right arm, Patient Position: Sitting, Cuff Size: Standard)   Pulse 79   Resp 18   Ht 6' 2.5\" (1.892 m)   Wt 102 kg (224 lb)   BMI 28.38 kg/m²   General/Constitutional: No apparent distress: well-nourished and well developed.  Eyes: normal ocular motion  Lymphatic: No " appreciable lymphadenopathy  Respiratory: Non-labored breathing  Vascular: No edema, swelling or tenderness, except as noted in detailed exam.  Integumentary: No impressive skin lesions present, except as noted in detailed exam.  Neuro: No ataxia or tremors noted  Psych: Normal mood and affect, oriented to person, place and time. Appropriate affect.  Musculoskeletal: Normal, except as noted in detailed exam and in HPI.    Examination    Right    Gait Antalgic with single point cane   Musculoskeletal Tender to palpation at anterolateral ankle    Skin Normal.    Nails Normal    Range of Motion  5 degrees dorsiflexion, 30 degrees plantarflexion  Subtalar motion: 20 degrees of inversion and eversion    Stability Stable    Muscle Strength 4/5 tibialis anterior  5/5 gastrocnemius-soleus  5/5 posterior tibialis  4/5 peroneal/eversion strength  4/5 EHL  5/5 FHL    Neurologic Normal    Sensation Intact to light touch throughout sural, saphenous, superficial peroneal, deep peroneal and medial/lateral plantar nerve distributions.  [place]-Karley 5.07 filament (10g) testing deferred.    Cardiovascular Brisk capillary refill < 2 seconds,intact DP and PT pulses    Special Tests None      Imaging Studies:   No new imaging      James R. Lachman, MD  Foot & Ankle Surgery   Department of Orthopaedic Surgery  [facility]      I personally performed the service.    James R. Lachman, MD    Scribe Attestation      I,:  Brandon Padgett am acting as a scribe while in the presence of the attending physician.:       I,:  James R Lachman, MD personally performed the services described in this documentation    as scribed in my presence.:

## 2024-01-02 ENCOUNTER — APPOINTMENT (OUTPATIENT)
Dept: PHYSICAL THERAPY | Age: 69
End: 2024-01-02
Payer: COMMERCIAL

## 2024-01-04 ENCOUNTER — TELEPHONE (OUTPATIENT)
Dept: PAIN MEDICINE | Facility: CLINIC | Age: 69
End: 2024-01-04

## 2024-01-04 ENCOUNTER — APPOINTMENT (OUTPATIENT)
Dept: PHYSICAL THERAPY | Age: 69
End: 2024-01-04
Payer: COMMERCIAL

## 2024-01-04 NOTE — TELEPHONE ENCOUNTER
LVM to reschedule f/u ov with Chari Marcelino has an opening on 1/9 at 11am - Hold placed    1/9 appt with Dr Watson cancelled

## 2024-01-10 ENCOUNTER — OFFICE VISIT (OUTPATIENT)
Dept: PHYSICAL THERAPY | Age: 69
End: 2024-01-10
Payer: COMMERCIAL

## 2024-01-10 DIAGNOSIS — M25.571 RIGHT ANKLE PAIN, UNSPECIFIED CHRONICITY: Primary | ICD-10-CM

## 2024-01-10 PROCEDURE — 97112 NEUROMUSCULAR REEDUCATION: CPT

## 2024-01-10 PROCEDURE — 97110 THERAPEUTIC EXERCISES: CPT

## 2024-01-10 PROCEDURE — 97530 THERAPEUTIC ACTIVITIES: CPT

## 2024-01-10 NOTE — PROGRESS NOTES
"Daily Note     Today's date: 1/10/2024  Patient name: Raheel Atwood  : 1955  MRN: [id number]  Referring provider: Mansoor Arreola*  Dx: No diagnosis found.               Subjective: Pt reports he is feeling better.       Objective: See treatment diary below      Assessment: Pt improving L ankle strength but R ankle strength continues to be limited. He was able to increase resistance for ankle 4-way with theraband. Difficulty with resisted eversion of R ankle. Increased weight for sled push to progress functional LE strength. Tolerated treatment well. Patient demonstrated fatigue post treatment, exhibited good technique with therapeutic exercises, and would benefit from continued PT      Plan: Continue per plan of care.  Progress treatment as tolerated.       Precautions: L TKA, R THR, Hx of R ankle fx    Manuals 11/27 11/29 12/5 12/8 12/12 12/15 12/19 12/21 1/10    Ankle PROM                                                    Neuro Re-Ed             Tandem ambulation     3 laps 3 laps 3 laps 6 laps  5 laps    Side steps              Marching on foam             Tandem static on foam      30\"x4 30\"x4  30\"x4                                           Ther Ex             Pt education on HEP, POC 10 min            Nu step for LE strengthening and ROM  Nu step L3 10' Nu step L3 10'  Nu step L3 10' Nu step L4 10'  Nu step L4 10' Nu step L4 10' Nu step L4 15'  Nu step L4 10'     Long sitting calf stretch c strap 3x30s L/R 30\"x3 ea 30\"x3 30\"x3 30\"x3 30\"x3 30\"x3      Ankle TB 4 way  YTB x15 ea (DF no TB) YTB 3x10 ea b/l RTB 3x10 ea b/l  RTB 3x10 ea b/l RTB 3x10 ea b/l RTB 3x10 ea b/l  RTB 3x10 ea b/l   3x10 GTB    Bridges  2x5 nv           SLR flexion   nv           Clamshells L/R             Standing hip 3 way   nv           Standing HR + TR if able   nv Nv* 3x10 ea 3x10 ea nv 3x10 ea  nv    Cybex leg press       NV?      Cybex LAQ             Cybex hip abd             Cybex ham curl              Ther " "Activity             STS c airex   nv 3x10 3x10  3x10 3x10 3x10   3x10     Stepping up lat  nv           Stepping up fw  nv 2x10 b/l 2x10 b/l 6\" 2x10 6\" b/l 2x10 8\" b/l 2x10 8\" b/l  2x10 8\" b/l     Sled push   3 laps 3 laps 25# 3 laps 25# 3 laps 25# 6 laps 25# 6 laps 25# 5 laps 50#    Gait Training                                       Modalities                                                              "

## 2024-01-10 NOTE — TELEPHONE ENCOUNTER
Caller: Raheel Atwood     Doctor: Dr Watson    Reason for call: Patient calling stating he received message from office regarding appt for Monday with Chari. I did advise patient of message below he would like a call back from . Thank you     Call back#: [phone]

## 2024-01-12 ENCOUNTER — OFFICE VISIT (OUTPATIENT)
Dept: PHYSICAL THERAPY | Age: 69
End: 2024-01-12
Payer: COMMERCIAL

## 2024-01-12 DIAGNOSIS — M25.571 RIGHT ANKLE PAIN, UNSPECIFIED CHRONICITY: Primary | ICD-10-CM

## 2024-01-12 PROCEDURE — 97112 NEUROMUSCULAR REEDUCATION: CPT

## 2024-01-12 PROCEDURE — 97530 THERAPEUTIC ACTIVITIES: CPT

## 2024-01-12 PROCEDURE — 97110 THERAPEUTIC EXERCISES: CPT

## 2024-01-12 NOTE — PROGRESS NOTES
"Daily Note     Today's date: 2024  Patient name: Raheel Atwood  : 1955  MRN: [id number]  Referring provider: Mansoor Arreola*  Dx:   Encounter Diagnosis     ICD-10-CM    1. Right ankle pain, unspecified chronicity  M25.571                      Subjective: Pt reports he is feeling stronger. Doesn't want surgery on his ankle yet and thinks he needs more therapy because he feels it is helping      Objective: See treatment diary below      Assessment: Pt with continued weakness in R ankle especially with eversion and inversion but it is progressing notably with dorsiflexion. Incorporated leg press and heel raises on leg press for additional LE strengthening. Pt was able to complete this with cueing to perform at a slower pace. Challenged dynamic ankle stability with static tandem balance on foam. May progress tandem walking on firm surface to a foam surface as pt has improved performance on firm surface. Tolerated treatment well. Patient demonstrated fatigue post treatment, exhibited good technique with therapeutic exercises, and would benefit from continued PT      Plan: Continue per plan of care.  Progress treatment as tolerated.       Precautions: L TKA, R THR, Hx of R ankle fx    Manuals 11/27 11/29 12/5 12/8 12/12 12/15 12/19 12/21 1/10 1/12   Ankle PROM                                                    Neuro Re-Ed             Tandem ambulation     3 laps 3 laps 3 laps 6 laps  5 laps 5 laps   Marching on foam             Tandem static on foam      30\"x4 30\"x4  30\"x4 30\"x4   Weighted walk outs (lucero)                                       Ther Ex             Pt education on HEP, POC 10 min            Nu step for LE strengthening and ROM  Nu step L3 10' Nu step L3 10'  Nu step L3 10' Nu step L4 10'  Nu step L4 10' Nu step L4 10' Nu step L4 15'  Nu step L4 10'  Nu step L4 10'    Long sitting calf stretch c strap 3x30s L/R 30\"x3 ea 30\"x3 30\"x3 30\"x3 30\"x3 30\"x3      Ankle TB 4 way  YTB x15 ea (DF " "no TB) YTB 3x10 ea b/l RTB 3x10 ea b/l  RTB 3x10 ea b/l RTB 3x10 ea b/l RTB 3x10 ea b/l  RTB 3x10 ea b/l   3x10 GTB 3x10 GTB   Bridges  2x5 nv           SLR flexion   nv           Clamshells L/R             Standing hip 3 way   nv           Standing HR + TR if able   nv Nv* 3x10 ea 3x10 ea nv 3x10 ea  nv nv   Cybex leg press       NV?   3x10 110#; HR 70# 3x10   Cybex LAQ             Cybex hip abd             Cybex ham curl              Ther Activity             STS c airex   nv 3x10 3x10  3x10 3x10 3x10   3x10  3x10    Stepping up lat  nv           Stepping up fw  nv 2x10 b/l 2x10 b/l 6\" 2x10 6\" b/l 2x10 8\" b/l 2x10 8\" b/l  2x10 8\" b/l  2x10    Sled push   3 laps 3 laps 25# 3 laps 25# 3 laps 25# 6 laps 25# 6 laps 25# 5 laps 50# 5 laps 50#   Gait Training                                       Modalities                                                                "

## 2024-01-14 ENCOUNTER — HOSPITAL ENCOUNTER (OUTPATIENT)
Dept: RADIOLOGY | Facility: HOSPITAL | Age: 69
Discharge: HOME | End: 2024-01-14
Attending: ORTHOPAEDIC SURGERY
Payer: COMMERCIAL

## 2024-01-14 DIAGNOSIS — M94.9 OSTEOCHONDRAL LESION OF TALAR DOME: ICD-10-CM

## 2024-01-14 DIAGNOSIS — M89.9 OSTEOCHONDRAL LESION OF TALAR DOME: ICD-10-CM

## 2024-01-14 PROCEDURE — G1004 CDSM NDSC: HCPCS

## 2024-01-14 PROCEDURE — 73721 MRI JNT OF LWR EXTRE W/O DYE: CPT

## 2024-01-15 ENCOUNTER — OFFICE VISIT (OUTPATIENT)
Dept: PHYSICAL THERAPY | Age: 69
End: 2024-01-15
Payer: COMMERCIAL

## 2024-01-15 DIAGNOSIS — M25.571 RIGHT ANKLE PAIN, UNSPECIFIED CHRONICITY: Primary | ICD-10-CM

## 2024-01-15 PROCEDURE — 97110 THERAPEUTIC EXERCISES: CPT

## 2024-01-15 PROCEDURE — 97530 THERAPEUTIC ACTIVITIES: CPT

## 2024-01-15 NOTE — PROGRESS NOTES
"Daily Note     Today's date: 1/15/2024  Patient name: Raheel Atwood  : 1955  MRN: [id number]  Referring provider: Mansoor Arreola*  Dx: No diagnosis found.               Subjective: Pt reports that he feels like he is getting stronger. Had MRI done on his ankle/foot. Going to see the spine doctor tomorrow.       Objective: See treatment diary below      Assessment: Pt able to increase weight/resistance for leg press and heel raise on leg press machine indicating improving LE and ankle strength. Continues to have weakness in R ankle with eversion. Tolerated treatment well. Patient demonstrated fatigue post treatment, exhibited good technique with therapeutic exercises, and would benefit from continued PT      Plan: Continue per plan of care.  Progress treatment as tolerated.       Precautions: L TKA, R THR, Hx of R ankle fx    Manuals 1/15 11/29 12/5 12/8 12/12 12/15 12/19 12/21 1/10 1/12   Ankle PROM                                                    Neuro Re-Ed             Tandem ambulation  NV   3 laps 3 laps 3 laps 6 laps  5 laps 5 laps   Marching on foam             Tandem static on foam NV     30\"x4 30\"x4  30\"x4 30\"x4   Weighted walk outs (lucero)                                       Ther Ex             Pt education on HEP, POC 10 min            Nu step for LE strengthening and ROM L4 10' Nu step L3 10' Nu step L3 10'  Nu step L3 10' Nu step L4 10'  Nu step L4 10' Nu step L4 10' Nu step L4 15'  Nu step L4 10'  Nu step L4 10'    Long sitting calf stretch c strap  30\"x3 ea 30\"x3 30\"x3 30\"x3 30\"x3 30\"x3      Ankle TB 4 way  3x10 GTB YTB 3x10 ea b/l RTB 3x10 ea b/l  RTB 3x10 ea b/l RTB 3x10 ea b/l RTB 3x10 ea b/l  RTB 3x10 ea b/l   3x10 GTB 3x10 GTB   Bridges   nv           SLR flexion   nv           Clamshells L/R             Standing hip 3 way   nv           Standing HR + TR if able   nv Nv* 3x10 ea 3x10 ea nv 3x10 ea  nv nv   Cybex leg press 3x10 120#; HR 90#      NV?   3x10 110#; HR 70# 3x10 " "  Cybex LAQ             Cybex hip abd             Cybex ham curl              Ther Activity             STS c airex  nv nv 3x10 3x10  3x10 3x10 3x10   3x10  3x10    Stepping up lat  nv           Stepping up fw nv nv 2x10 b/l 2x10 b/l 6\" 2x10 6\" b/l 2x10 8\" b/l 2x10 8\" b/l  2x10 8\" b/l  2x10    Sled push 5 laps 50#  3 laps 3 laps 25# 3 laps 25# 3 laps 25# 6 laps 25# 6 laps 25# 5 laps 50# 5 laps 50#   Gait Training                                       Modalities                                                                  "

## 2024-01-17 ENCOUNTER — APPOINTMENT (OUTPATIENT)
Dept: PHYSICAL THERAPY | Age: 69
End: 2024-01-17
Payer: COMMERCIAL

## 2024-01-18 ENCOUNTER — TELEPHONE (OUTPATIENT)
Age: 69
End: 2024-01-18

## 2024-01-22 ENCOUNTER — OFFICE VISIT (OUTPATIENT)
Dept: PAIN MEDICINE | Facility: CLINIC | Age: 69
End: 2024-01-22
Payer: COMMERCIAL

## 2024-01-22 VITALS
DIASTOLIC BLOOD PRESSURE: 82 MMHG | BODY MASS INDEX: 28.85 KG/M2 | SYSTOLIC BLOOD PRESSURE: 126 MMHG | WEIGHT: 232 LBS | TEMPERATURE: 97.9 F | HEIGHT: 75 IN | HEART RATE: 80 BPM

## 2024-01-22 DIAGNOSIS — M47.812 CERVICAL SPONDYLOSIS: ICD-10-CM

## 2024-01-22 DIAGNOSIS — M79.18 MYOFASCIAL PAIN SYNDROME: ICD-10-CM

## 2024-01-22 DIAGNOSIS — G89.29 CHRONIC BILATERAL LOW BACK PAIN WITHOUT SCIATICA: ICD-10-CM

## 2024-01-22 DIAGNOSIS — M54.12 CERVICAL RADICULOPATHY: Primary | ICD-10-CM

## 2024-01-22 DIAGNOSIS — M54.50 CHRONIC BILATERAL LOW BACK PAIN WITHOUT SCIATICA: ICD-10-CM

## 2024-01-22 DIAGNOSIS — M47.816 LUMBAR SPONDYLOSIS: ICD-10-CM

## 2024-01-22 PROCEDURE — 99214 OFFICE O/P EST MOD 30 MIN: CPT | Performed by: PHYSICIAN ASSISTANT

## 2024-01-22 RX ORDER — METHOCARBAMOL 750 MG/1
750 TABLET, FILM COATED ORAL EVERY 8 HOURS PRN
Qty: 90 TABLET | Refills: 2 | Status: SHIPPED | OUTPATIENT
Start: 2024-01-22
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
## 2024-01-24 ENCOUNTER — EVALUATION (OUTPATIENT)
Dept: PHYSICAL THERAPY | Age: 69
End: 2024-01-24
Payer: COMMERCIAL

## 2024-01-24 DIAGNOSIS — M25.571 RIGHT ANKLE PAIN, UNSPECIFIED CHRONICITY: ICD-10-CM

## 2024-01-24 DIAGNOSIS — R29.898 SHOULDER WEAKNESS: ICD-10-CM

## 2024-01-24 DIAGNOSIS — M54.12 CERVICAL RADICULOPATHY: ICD-10-CM

## 2024-01-24 DIAGNOSIS — G89.29 CHRONIC LEFT SHOULDER PAIN: Primary | ICD-10-CM

## 2024-01-24 DIAGNOSIS — M25.512 CHRONIC LEFT SHOULDER PAIN: Primary | ICD-10-CM

## 2024-01-24 PROCEDURE — 97110 THERAPEUTIC EXERCISES: CPT

## 2024-01-24 PROCEDURE — 97164 PT RE-EVAL EST PLAN CARE: CPT

## 2024-01-24 NOTE — PROGRESS NOTES
PT Re-Evaluation     Today's date: 2024  Patient name: Raheel Atwood  : 1955  MRN: [id number]  Referring provider: Mansoor Arreola*  Dx:   Encounter Diagnosis     ICD-10-CM    1. Chronic left shoulder pain  M25.512     G89.29       2. Cervical radiculopathy  M54.12       3. Shoulder weakness  R29.898       4. Right ankle pain, unspecified chronicity  M25.571           Start Time: 1100  Stop Time: 1152  Total time in clinic (min): 52 minutes    Assessment  Assessment details: Pt is a 68 y.o. male who presents to OP PT for re-evaluation of L shoulder pain and neck pain which was being treated previously by a PT at a clinic that is closed. Pt is currently being treated at [facility] for R ankle pain which is consistently improving. Signs and symptoms indicate probable diagnosis of L shoulder RTC partial tear. he has primary impairments of decreased L shoulder ROM, decreased L shoulder strength, and decreased cervical ROM/flexibility. He is limited functionally as he has difficulty with overhead reaching, difficulty dressing, difficulty with self-grooming, difficulty driving, difficulty lifting/carrying objects, and he is unable to participate in usual leisure activities (going to the gym to lift weights). Provided pt with exercises for scapular stabilization and cervical stretching which he was able to complete today without adverse response. Educated pt on proper completion of HEP, normal response to exercises, diagnosis, and POC. He verbalized understanding of all education provided. All questions answered. Pt would benefit from skilled OP PT in order to improve upon impairments and return to PLOF.  Impairments: abnormal gait, abnormal or restricted ROM, impaired balance, impaired physical strength, lacks appropriate home exercise program, pain with function, scapular dyskinesis, weight-bearing intolerance and poor posture   Understanding of Dx/Px/POC: good   Prognosis: good    Goals  Short term  goals  Pt will improve strength to 4-/5 in order to perform lifting/carrying activities within 2-3 weeks  Pt will improve AROM by at least 5 degrees to improve overhead reaching ability within  2-3 weeks    Long term goals  Pt will be independent with advanced HEP by d/c  Pt will ROM and strength to WNL in order to perform normal leisure activities with a 90% reduction in symptoms by d/c  Pt will be able to tolerate bench pressing and lifting without discomfort/difficulty by d/c  Pt will improve FOTO >/= expected        Plan  Patient would benefit from: skilled physical therapy  Other planned modality interventions: Any PRN  Planned therapy interventions: patient education, therapeutic exercise, graded exercise, functional ROM exercises, flexibility, home exercise program, manual therapy, activity modification, strengthening, stretching, joint mobilization, massage, therapeutic activities, balance, balance/weight bearing training and neuromuscular re-education  Frequency: 3x week  Duration in weeks: 6  Treatment plan discussed with: patient        Subjective Evaluation    History of Present Illness  Mechanism of injury: Pt reports he has a knot in his L shoulder. He was doing therapy before which was helping but that place is closing. He still has difficulty reaching up with the L shoulder. He feels like its a strength factor now. He can move a little bit better. He thinks also needs a little bit of flexibility. Before he could not wash his hair but it has gotten better. He can only lift three lbs. Difficulty reaching up to his third shelf. Reaching behind to wash his back, putting on his pants, difficulty putting on his shoes, difficulty driving because he has difficulty. He does neck pain that radiates to about his elbow in the L arm.           Recurrent probem    Pain  Current pain ratin  At best pain ratin  At worst pain rating: 10  Quality: tight and throbbing  Relieving factors: rest and  medications  Aggravating factors: lifting and overhead activity    Hand dominance: right          Objective     Active Range of Motion   Cervical/Thoracic Spine       Cervical    Flexion: 30 degrees   Extension: 15 degrees     with pain  Left lateral flexion: 15 degrees     with pain  Right lateral flexion: 28 degrees     with pain  Left rotation: 35 degrees with pain  Right rotation: 54 degrees    with pain  Left Shoulder   Flexion: 118 degrees with pain  Abduction: 121 degrees with pain  External rotation 0°: 43 degrees   External rotation 90°: 80 degrees with pain  Internal rotation 90°: WFL    Additional Active Range of Motion Details  Functional IR (cm from thumb tip to occipital protuberance):  LUE: 35 cm    Passive Range of Motion   Left Shoulder   Flexion: 145 degrees with pain  Abduction: 150 degrees with pain  External rotation 90°: WFL and with pain  Internal rotation 90°: WFL    Strength/Myotome Testing     Left Shoulder     Planes of Motion   Flexion: 3-   Extension: 3+   Abduction: 3-   External rotation at 0°: 3-   Internal rotation at 0°: 3-     Right Shoulder     Planes of Motion   Flexion: 3+   Extension: 3+   Abduction: 3+   External rotation at 0°: 4   Internal rotation at 0°: 4     Left Elbow   Flexion: 4  Extension: 4    Right Elbow   Flexion: 4  Extension: 4    Left Wrist/Hand   Wrist extension: WFL  Wrist flexion: WFL  Thumb extension: WFL    Right Wrist/Hand   Wrist extension: WFL  Wrist flexion: WFL  Thumb extension: WFL    Tests     Left Shoulder   Positive drop arm, empty can and Neer's.   Negative belly press and external rotation lag sign.              Precautions: R ankle weakness, L shoulder pain    Manuals 1/15 1/24 12/5 12/8 12/12 12/15 12/19 12/21 1/10 1/12   Ankle PROM             Shoulder PROM  Assessed                                     Neuro Re-Ed             Tandem ambulation  NV   3 laps 3 laps 3 laps 6 laps  5 laps 5 laps   Marching on foam             Tandem static on foam  "NV     30\"x4 30\"x4  30\"x4 30\"x4   Weighted walk outs (lucero)                                       Ther Ex             Pt education on HEP, POC 10 min            Nu step for LE strengthening and ROM L4 10'  Nu step L3 10'  Nu step L3 10' Nu step L4 10'  Nu step L4 10' Nu step L4 10' Nu step L4 15'  Nu step L4 10'  Nu step L4 10'    Long sitting calf stretch c strap   30\"x3 30\"x3 30\"x3 30\"x3 30\"x3      Ankle TB 4 way  3x10 GTB  RTB 3x10 ea b/l  RTB 3x10 ea b/l RTB 3x10 ea b/l RTB 3x10 ea b/l  RTB 3x10 ea b/l   3x10 GTB 3x10 GTB   Bridges              SLR flexion              Clamshells L/R             Standing hip 3 way              Standing HR + TR if able    Nv* 3x10 ea 3x10 ea nv 3x10 ea  nv nv   Cybex leg press 3x10 120#; HR 90# 3x10 120#; HR 90#     NV?   3x10 110#; HR 70# 3x10   Cybex LAQ             Cybex hip abd             Cybex ham curl                           UBE             UT stretch  30\"x3           Supine protraction  3x10 3#           AROM flex w/ wt             AAROM shoulder             S/L ER             Prone I's, Y's, T's             Wall slides             Corner stretch             Rows/ext w/ TB  X10 BTB                                                  Ther Activity             STS c airex  nv  3x10 3x10  3x10 3x10 3x10   3x10  3x10    Stepping up lat             Stepping up fw nv  2x10 b/l 2x10 b/l 6\" 2x10 6\" b/l 2x10 8\" b/l 2x10 8\" b/l  2x10 8\" b/l  2x10    Sled push 5 laps 50#  3 laps 3 laps 25# 3 laps 25# 3 laps 25# 6 laps 25# 6 laps 25# 5 laps 50# 5 laps 50#   Gait Training                                       Modalities                                                "

## 2024-01-31 ENCOUNTER — APPOINTMENT (OUTPATIENT)
Dept: PHYSICAL THERAPY | Age: 69
End: 2024-01-31
Payer: COMMERCIAL

## 2024-02-07 ENCOUNTER — TELEPHONE (OUTPATIENT)
Dept: VASCULAR SURGERY | Facility: CLINIC | Age: 69
End: 2024-02-07

## 2024-02-07 ENCOUNTER — OFFICE VISIT (OUTPATIENT)
Dept: PHYSICAL THERAPY | Age: 69
End: 2024-02-07
Payer: COMMERCIAL

## 2024-02-07 DIAGNOSIS — R29.898 SHOULDER WEAKNESS: ICD-10-CM

## 2024-02-07 DIAGNOSIS — G89.29 CHRONIC LEFT SHOULDER PAIN: Primary | ICD-10-CM

## 2024-02-07 DIAGNOSIS — M25.512 CHRONIC LEFT SHOULDER PAIN: Primary | ICD-10-CM

## 2024-02-07 DIAGNOSIS — M54.12 CERVICAL RADICULOPATHY: ICD-10-CM

## 2024-02-07 DIAGNOSIS — M25.571 RIGHT ANKLE PAIN, UNSPECIFIED CHRONICITY: ICD-10-CM

## 2024-02-07 PROCEDURE — 97110 THERAPEUTIC EXERCISES: CPT

## 2024-02-07 PROCEDURE — 97140 MANUAL THERAPY 1/> REGIONS: CPT

## 2024-02-07 NOTE — PROGRESS NOTES
Daily Note     Today's date: 2024  Patient name: Raheel Atwood  : 1955  MRN: [id number]  Referring provider: Mansoor Arreola*  Dx:   Encounter Diagnosis     ICD-10-CM    1. Chronic left shoulder pain  M25.512     G89.29       2. Cervical radiculopathy  M54.12       3. Shoulder weakness  R29.898       4. Right ankle pain, unspecified chronicity  M25.571                      Subjective: Pt reports that he has been doing exercises at home for shoulder and legs.       Objective: See treatment diary below      Assessment: Pt with most difficulty completing side-lying external rotation with weight. Required cues to prevent shoulder abduction and scapular retraction as he was fatiguing. PROM is being maintained WNL. He would benefit from progression of strengthening exercises for both LE's and L shoulder. Tolerated treatment well. Patient demonstrated fatigue post treatment, exhibited good technique with therapeutic exercises, and would benefit from continued PT      Plan: Continue per plan of care.  Progress treatment as tolerated.       Precautions: R ankle weakness, L shoulder pain    Manuals 1/15 1/24 2/7          Ankle PROM             Shoulder PROM  Assessed NR                                    Neuro Re-Ed             Tandem ambulation  NV            Marching on foam             Tandem static on foam NV            Weighted walk outs (lucero)                                       Ther Ex             Pt education on HEP, POC 10 min            Nu step for LE strengthening and ROM L4 10'            Long sitting calf stretch c strap             Ankle TB 4 way  3x10 GTB            Bridges              SLR flexion              Clamshells L/R             Standing hip 3 way              Standing HR + TR if able              Cybex leg press 3x10 120#; HR 90# 3x10 120#; HR 90# 3x10 120#; HR 90#          Cybex LAQ             Cybex hip abd             Cybex ham curl                           UBE   4'/4'       "    UT stretch  30\"x3           Supine protraction  3x10 3# 3x10 5#          AROM flex w/ wt             AAROM shoulder   3x10 5#          S/L ER   3x10 2#          Prone I's, Y's, T's   2x10 ea          Wall slides   x30          Corner stretch   30\"x3          Rows/ext w/ TB  X10 BTB                                                  Ther Activity             STS c airex  nv            Stepping up lat             Stepping up fw nv            Sled push 5 laps 50#            Gait Training                                       Modalities                                                "

## 2024-02-07 NOTE — TELEPHONE ENCOUNTER
Called pt to see if he uses his [facility] ins or [facility]. If [facility] he needs to contact them and contreras a referral for us.

## 2024-02-09 ENCOUNTER — TELEPHONE (OUTPATIENT)
Dept: CARDIOLOGY CLINIC | Facility: CLINIC | Age: 69
End: 2024-02-09

## 2024-02-09 ENCOUNTER — TELEPHONE (OUTPATIENT)
Dept: VASCULAR SURGERY | Facility: CLINIC | Age: 69
End: 2024-02-09
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
## 2024-02-09 NOTE — TELEPHONE ENCOUNTER
Patient was scheduled with Dr. Paige on 02/09/24@8:30am in [place], pt arrived at 8:50am and was told that with needed to consult with the doctor to see if he was able to see him at that time. Patient was absent and left the office.

## 2024-02-13 ENCOUNTER — APPOINTMENT (OUTPATIENT)
Dept: PHYSICAL THERAPY | Age: 69
End: 2024-02-13
Payer: COMMERCIAL

## 2024-02-16 ENCOUNTER — OFFICE VISIT (OUTPATIENT)
Dept: PHYSICAL THERAPY | Age: 69
End: 2024-02-16
Payer: COMMERCIAL

## 2024-02-16 DIAGNOSIS — M25.512 CHRONIC LEFT SHOULDER PAIN: Primary | ICD-10-CM

## 2024-02-16 DIAGNOSIS — G89.29 CHRONIC LEFT SHOULDER PAIN: Primary | ICD-10-CM

## 2024-02-16 DIAGNOSIS — R29.898 SHOULDER WEAKNESS: ICD-10-CM

## 2024-02-16 DIAGNOSIS — M25.571 RIGHT ANKLE PAIN, UNSPECIFIED CHRONICITY: ICD-10-CM

## 2024-02-16 DIAGNOSIS — M54.12 CERVICAL RADICULOPATHY: ICD-10-CM

## 2024-02-16 PROCEDURE — 97112 NEUROMUSCULAR REEDUCATION: CPT

## 2024-02-16 PROCEDURE — 97110 THERAPEUTIC EXERCISES: CPT

## 2024-02-16 NOTE — PROGRESS NOTES
"Daily Note     Today's date: 2024  Patient name: Raheel Atwood  : 1955  MRN: [id number]  Referring provider: Mansoor Arreola*  Dx:   Encounter Diagnosis     ICD-10-CM    1. Chronic left shoulder pain  M25.512     G89.29       2. Cervical radiculopathy  M54.12                      Subjective: Pt reports he has been completing exercises for L shoulder and is improving ability to bench and row at home. He hasn't been doing as much of his ankle and LE exercises.       Objective: See treatment diary below      Assessment: Pt continues to have difficulty with dynamic stability in R ankle as he requires intermittent UE assistance for support during tandem balance on compliant surface and during tandem ambulation of firm surface. Progressing strength as he was able to tolerate heel raises and toe raises in standing with good form/technique. Increased weight for leg press for LE strengthening.  Tolerated treatment well. Patient demonstrated fatigue post treatment, exhibited good technique with therapeutic exercises, and would benefit from continued PT      Plan: Continue per plan of care.  Progress treatment as tolerated.       Precautions: R ankle weakness, L shoulder pain    Manuals 1/15 1/24 2/7 2/16         Ankle PROM             Shoulder PROM  Assessed NR                                    Neuro Re-Ed             Tandem ambulation  NV   4 laps          Marching on foam             Tandem static on foam NV   30\"x4         Weighted walk outs (lucero)                                       Ther Ex             Pt education on HEP, POC 10 min            Nu step for LE strengthening and ROM L4 10'   L4 10'          Long sitting calf stretch c strap    30\"x3         Ankle TB 4 way  3x10 GTB   HEP         Bridges              SLR flexion              Clamshells L/R             Standing hip 3 way              Standing HR + TR if able     3x10 ea         Cybex leg press 3x10 120#; HR 90# 3x10 120#; HR 90# 3x10 " "120#; HR 90# 3x10 130#; HR 90#         Cybex LAQ             Cybex hip abd             Cybex ham curl                           UBE   4'/4'          UT stretch  30\"x3           Supine protraction  3x10 3# 3x10 5#          AROM flex w/ wt             AAROM shoulder   3x10 5#          S/L ER   3x10 2#          Prone I's, Y's, T's   2x10 ea          Wall slides   x30          Corner stretch   30\"x3          Rows/ext w/ TB  X10 BTB                                                  Ther Activity             STS c airex  nv   3x10          Stepping up lat             Stepping up fw nv            Sled push 5 laps 50#   5 laps 50#         Gait Training                                       Modalities                                                  "

## 2024-02-20 ENCOUNTER — APPOINTMENT (OUTPATIENT)
Dept: PHYSICAL THERAPY | Age: 69
End: 2024-02-20
Payer: COMMERCIAL

## 2024-02-28 ENCOUNTER — OFFICE VISIT (OUTPATIENT)
Dept: PHYSICAL THERAPY | Age: 69
End: 2024-02-28
Payer: COMMERCIAL

## 2024-02-28 DIAGNOSIS — M25.512 CHRONIC LEFT SHOULDER PAIN: ICD-10-CM

## 2024-02-28 DIAGNOSIS — G89.29 CHRONIC LEFT SHOULDER PAIN: ICD-10-CM

## 2024-02-28 DIAGNOSIS — M25.571 RIGHT ANKLE PAIN, UNSPECIFIED CHRONICITY: Primary | ICD-10-CM

## 2024-02-28 DIAGNOSIS — M54.12 CERVICAL RADICULOPATHY: ICD-10-CM

## 2024-02-28 DIAGNOSIS — R29.898 SHOULDER WEAKNESS: ICD-10-CM

## 2024-02-28 PROCEDURE — 97110 THERAPEUTIC EXERCISES: CPT

## 2024-02-28 PROCEDURE — 97530 THERAPEUTIC ACTIVITIES: CPT

## 2024-02-28 NOTE — PROGRESS NOTES
"Daily Note     Today's date: 2024  Patient name: Raheel Atwood  : 1955  MRN: [id number]  Referring provider: Mansoor Arreola*  Dx:   Encounter Diagnosis     ICD-10-CM    1. Right ankle pain, unspecified chronicity  M25.571       2. Chronic left shoulder pain  M25.512     G89.29       3. Cervical radiculopathy  M54.12       4. Shoulder weakness  R29.898                      Subjective: Pt reports that his shoulder is feeling pretty good. He is able to bench about 30 lbs and has been doing exercises with resistance bands. He still notes weakness in his legs and ankle and would like to focus on that today.       Objective: See treatment diary below      Assessment: Pt challenged by tandem balance on compliant surface as he demonstrates moderate trunk sway in order to maintain balance. Progressed sled push by increasing amount of weight and number of laps which pt tolerated well. Tolerated treatment well. Patient demonstrated fatigue post treatment, exhibited good technique with therapeutic exercises, and would benefit from continued PT      Plan: Continue per plan of care.  Progress treatment as tolerated.       Precautions: R ankle weakness, L shoulder pain    Manuals 1/15 1/24 2/7 2/16 2/28        Ankle PROM             Shoulder PROM  Assessed NR                                    Neuro Re-Ed             Tandem ambulation  NV   4 laps          Marching on foam             Tandem static on foam NV   30\"x4 30\"x4        Weighted walk outs (lucero)                                       Ther Ex             Pt education on HEP, POC 10 min            Nu step for LE strengthening and ROM L4 10'   L4 10'  L4 10'         Long sitting calf stretch c strap    30\"x3         Ankle TB 4 way  3x10 GTB   HEP         Bridges              SLR flexion              Clamshells L/R             Standing hip 3 way              Standing HR + TR if able     3x10 ea 3x10 ea        Cybex leg press 3x10 120#; HR 90# 3x10 " "120#; HR 90# 3x10 120#; HR 90# 3x10 130#; HR 90# 3x10 130#; HR 90#        Cybex LAQ             Cybex hip abd             Cybex ham curl                           UBE   4'/4'          UT stretch  30\"x3           Supine protraction  3x10 3# 3x10 5#          AROM flex w/ wt             AAROM shoulder   3x10 5#          S/L ER   3x10 2#          Prone I's, Y's, T's   2x10 ea          Wall slides   x30          Corner stretch   30\"x3          Rows/ext w/ TB  X10 BTB                                                  Ther Activity             STS c airex  nv   3x10  3x10        Stepping up lat             Stepping up fw nv            Sled push 5 laps 50#   5 laps 50# 5 laps 75#        Gait Training                                       Modalities                                                    "

## 2024-03-04 ENCOUNTER — OFFICE VISIT (OUTPATIENT)
Dept: PHYSICAL THERAPY | Age: 69
End: 2024-03-04
Payer: COMMERCIAL

## 2024-03-04 DIAGNOSIS — M25.512 CHRONIC LEFT SHOULDER PAIN: ICD-10-CM

## 2024-03-04 DIAGNOSIS — G89.29 CHRONIC LEFT SHOULDER PAIN: ICD-10-CM

## 2024-03-04 DIAGNOSIS — M25.571 RIGHT ANKLE PAIN, UNSPECIFIED CHRONICITY: Primary | ICD-10-CM

## 2024-03-04 PROCEDURE — 97110 THERAPEUTIC EXERCISES: CPT

## 2024-03-04 NOTE — PROGRESS NOTES
"Daily Note and Discharge    Today's date: 3/4/2024  Patient name: Raheel Atwood  : 1955  MRN: [id number]  Referring provider: Mansoor Arreola*  Dx:   Encounter Diagnosis     ICD-10-CM    1. Right ankle pain, unspecified chronicity  M25.571       2. Chronic left shoulder pain  M25.512     G89.29                      Subjective: Pt reports that he does not have any significant functional limitations. He is able to do all of his normal daily activities. He is performing his exercises daily. He feels stronger       Objective: See treatment diary below    Strength (MMT) LLE: WNL      RLE: WNL except knee flexion ; foot eversion 4/5; plantarflexion 3/5    Assessment: Pt has improved LE strength and LUE strength to within normal functional limits. He has achieved all short term and long term goals. He will transition to HEP at this time. If he feels his strength declines or symptoms worsen, he can return for re-evaluation. Pt is agreeable to this POC. Tolerated treatment well. Patient demonstrated fatigue post treatment, exhibited good technique with therapeutic exercises, and would benefit from continued PT      Plan:  D/C to HEP      Precautions: R ankle weakness, L shoulder pain    Manuals 1/15 1/24 2/7 2/16 2/28 3/4       Ankle PROM             Shoulder PROM  Assessed NR                                    Neuro Re-Ed             Tandem ambulation  NV   4 laps          Marching on foam             Tandem static on foam NV   30\"x4 30\"x4        Weighted walk outs (lucero)                                       Ther Ex             Pt education on HEP, POC 10 min            Nu step for LE strengthening and ROM L4 10'   L4 10'  L4 10'  L4 10'       Long sitting calf stretch c strap    30\"x3         Ankle TB 4 way  3x10 GTB   HEP         Bridges              SLR flexion              Clamshells L/R             Standing hip 3 way              Standing HR + TR if able     3x10 ea 3x10 ea        Cybex leg press " "3x10 120#; HR 90# 3x10 120#; HR 90# 3x10 120#; HR 90# 3x10 130#; HR 90# 3x10 130#; HR 90# 3x10 130#; HR 90#       Cybex LAQ             Cybex hip abd             Cybex ham curl                           UBE   4'/4'          UT stretch  30\"x3           Supine protraction  3x10 3# 3x10 5#          AROM flex w/ wt             AAROM shoulder   3x10 5#          S/L ER   3x10 2#          Prone I's, Y's, T's   2x10 ea          Wall slides   x30          Corner stretch   30\"x3          Rows/ext w/ TB  X10 BTB                                                  Ther Activity             STS c airex  nv   3x10  3x10        Stepping up lat             Stepping up fw nv            Sled push 5 laps 50#   5 laps 50# 5 laps 75# 5 laps 75#       Gait Training                                       Modalities                                                      "

## 2024-03-06 ENCOUNTER — APPOINTMENT (OUTPATIENT)
Dept: PHYSICAL THERAPY | Age: 69
End: 2024-03-06
Payer: COMMERCIAL

## 2024-03-11 ENCOUNTER — APPOINTMENT (OUTPATIENT)
Dept: PHYSICAL THERAPY | Age: 69
End: 2024-03-11
Payer: COMMERCIAL

## 2024-03-13 ENCOUNTER — APPOINTMENT (OUTPATIENT)
Dept: PHYSICAL THERAPY | Age: 69
End: 2024-03-13
Payer: COMMERCIAL

## 2024-04-19 ENCOUNTER — APPOINTMENT (OUTPATIENT)
Dept: RADIOLOGY | Facility: CLINIC | Age: 69
End: 2024-04-19
Payer: COMMERCIAL

## 2024-04-19 ENCOUNTER — OFFICE VISIT (OUTPATIENT)
Dept: PAIN MEDICINE | Facility: CLINIC | Age: 69
End: 2024-04-19
Payer: COMMERCIAL

## 2024-04-19 VITALS
HEART RATE: 82 BPM | HEIGHT: 75 IN | BODY MASS INDEX: 27.1 KG/M2 | TEMPERATURE: 97.2 F | SYSTOLIC BLOOD PRESSURE: 128 MMHG | WEIGHT: 218 LBS | DIASTOLIC BLOOD PRESSURE: 80 MMHG

## 2024-04-19 DIAGNOSIS — G89.29 CHRONIC PAIN OF RIGHT KNEE: ICD-10-CM

## 2024-04-19 DIAGNOSIS — M79.18 MYOFASCIAL PAIN SYNDROME: ICD-10-CM

## 2024-04-19 DIAGNOSIS — M79.672 LEFT FOOT PAIN: ICD-10-CM

## 2024-04-19 DIAGNOSIS — M54.16 LUMBAR RADICULOPATHY: Primary | ICD-10-CM

## 2024-04-19 DIAGNOSIS — M47.816 LUMBAR SPONDYLOSIS: ICD-10-CM

## 2024-04-19 DIAGNOSIS — M25.561 CHRONIC PAIN OF RIGHT KNEE: ICD-10-CM

## 2024-04-19 PROCEDURE — 73630 X-RAY EXAM OF FOOT: CPT

## 2024-04-19 PROCEDURE — 99214 OFFICE O/P EST MOD 30 MIN: CPT | Performed by: PHYSICIAN ASSISTANT

## 2024-04-19 RX ORDER — TIZANIDINE 4 MG/1
4 TABLET ORAL
Qty: 30 TABLET | Refills: 2 | Status: SHIPPED | OUTPATIENT
Start: 2024-04-19

## 2024-04-19 NOTE — PATIENT INSTRUCTIONS
Tizanidine (By mouth)   Tizanidine ([id number])  Treats muscle spasticity.   Brand Name(s): Zanaflex, Zanaflex Capsule   There may be other brand names for this medicine.  When This Medicine Should Not Be Used:   This medicine is not right for everyone. Do not use if you had an allergic reaction to tizanidine.  How to Use This Medicine:   Capsule, Tablet  Take your medicine as directed. Your dose may need to be changed several times to find what works best for you.  You may take this medicine with or without food, but always take it the same way every time. Tizanidine works differently depending on whether you take it on an empty stomach or a full stomach. Talk to your doctor if you have any questions about this.  Missed dose: Take a dose as soon as you remember. If it is almost time for your next dose, wait until then and take a regular dose. Do not take extra medicine to make up for a missed dose.  Store the medicine in a closed container at room temperature, away from heat, moisture, and direct light.  Drugs and Foods to Avoid:   Ask your doctor or pharmacist before using any other medicine, including over-the-counter medicines, vitamins, and herbal products.  Do not use this medicine together with ciprofloxacin or fluvoxamine.  Some foods and medicines can affect how tizanidine works. Tell your doctor if you are using any of the following:  Acyclovir, baclofen, cimetidine, famotidine, ticlopidine, verapamil, zileuton  Birth control pills, blood pressure medicine, medicine for heart rhythm problems (such as amiodarone, mexiletine, propafenone), or medicine to treat an infection (such as levofloxacin, moxifloxacin)  Do not drink alcohol while you are using this medicine.  Tell your doctor if you use anything else that makes you sleepy. Some examples are allergy medicine, narcotic pain medicine, and alcohol.  Warnings While Using This Medicine:   Tell your doctor if you are pregnant or breastfeeding, or if you  have kidney disease or liver disease.  This medicine may cause the following problems:  Low blood pressure  Liver damage  This medicine may make you dizzy or drowsy. Do not drive or do anything else that could be dangerous until you know how this medicine affects you. Stand or sit up slowly if you are dizzy.  Do not stop using this medicine suddenly. Your doctor will need to slowly decrease your dose before you stop it completely.  Your doctor will do lab tests at regular visits to check on the effects of this medicine. Keep all appointments.  Keep all medicine out of the reach of children. Never share your medicine with anyone.  Possible Side Effects While Using This Medicine:   Call your doctor right away if you notice any of these side effects:  Allergic reaction: Itching or hives, swelling in your face or hands, swelling or tingling in your mouth or throat, chest tightness, trouble breathing  Dark urine or pale stools, nausea, vomiting, loss of appetite, stomach pain, yellow skin or eyes  Lightheadedness, dizziness, or fainting  Seeing or hearing things that are not really there  Slow heartbeat  If you notice these less serious side effects, talk with your doctor:   Dry mouth  Drowsiness or sleepiness  Weakness  If you notice other side effects that you think are caused by this medicine, tell your doctor.   Call your doctor for medical advice about side effects. You may report side effects to FDA at 0-491-FDA-1296  © Copyright [employer] 2023 Information is for End User's use only and may not be sold, redistributed or otherwise used for commercial purposes.  The above information is an  only. It is not intended as medical advice for individual conditions or treatments. Talk to your doctor, nurse or pharmacist before following any medical regimen to see if it is safe and effective for you.

## 2024-04-19 NOTE — PROGRESS NOTES
Assessment:  1. Lumbar radiculopathy    2. Lumbar spondylosis    3. Chronic pain of right knee    4. Left foot pain    5. Myofascial pain syndrome        Plan:  While the patient was in the office today, I did have a thorough conversation regarding their chronic pain syndrome, medication management, and treatment plan options.    After discussing options at length, I have recommended proceeding with imaging given his pain complaints.  I feel it is appropriate to order an MRI of the lumbar spine for further evaluation of the persistent low back pain and right lower extremity radicular pain.  He may be a candidate for an epidural steroid injection which I discussed with him on today's visit and literature was provided.      In order to assess the instability and pain of his right knee, I have placed an order for an MRI of the right knee on today's visit.  He will likely need to see one of our orthopedic doctors regarding this.    X-ray of the left foot for further evaluation of the plantar pain he is experiencing.  I did explain this could be radicular in nature however.    Trial of tizanidine 4 mg nightly as needed instead of methocarbamol.  I advised the patient that they should not drive or operate machinery while on this medication until they see how it affects them, as it could cause lethargy and mental cloudiness. I advised the patient to call our office if they experience any side effects or issues with the medication changes. The patient verbalized an understanding.    Continue home exercise program.    Follow-up after the imaging.    The patient was advised to contact the office should their symptoms worsen in the interim. The patient was agreeable and verbalized an understanding.        History of Present Illness:    The patient is a 68 y.o. male last seen on 1/22/2024 who presents for a follow up office visit in regards to chronic low back pain, right knee pain, neck pain.  The patient currently reports  increasing low back pain with radiation down the lateral aspect of the right lower extremity as well as increasing right knee pain and instability.  He rates his pain a 7 out of 10 and describes it as an intermittent dull, aching, throbbing pain.  Pain is made worse with standing and walking.  Patient is also noting new pain in the plantar aspect of the left foot.  Patient states that he may have had some imaging of his spine last year or the year before however I do not see any MRI of the lumbar spine on his chart.  He has attended physical therapy with benefit and does attempt to his own home exercise program.  He is having difficulty doing this however because of the pain.    I have personally reviewed and/or updated the patient's past medical history, past surgical history, family history, social history, current medications, allergies, and vital signs today.       Review of Systems:    Review of Systems   Respiratory:  Negative for shortness of breath.    Cardiovascular:  Negative for chest pain.   Gastrointestinal:  Negative for constipation, diarrhea, nausea and vomiting.   Musculoskeletal:  Positive for gait problem. Negative for arthralgias, joint swelling (Joint stiffness) and myalgias.   Skin:  Negative for rash.   Neurological:  Positive for dizziness and weakness. Negative for seizures.   All other systems reviewed and are negative.        Past Medical History:   Diagnosis Date   • Anxiety        Past Surgical History:   Procedure Laterality Date   • HERNIA REPAIR         History reviewed. No pertinent family history.    Social History     Occupational History   • Not on file   Tobacco Use   • Smoking status: Some Days     Current packs/day: 0.25     Types: Cigarettes   • Smokeless tobacco: Never   Vaping Use   • Vaping status: Never Used   Substance and Sexual Activity   • Alcohol use: Yes   • Drug use: Yes     Types: Marijuana   • Sexual activity: Not Currently         Current Outpatient Medications:    •  acetaminophen (TYLENOL) 650 mg CR tablet, Take 650 mg by mouth every 8 (eight) hours as needed for mild pain, Disp: , Rfl:   •  albuterol (PROVENTIL HFA,VENTOLIN HFA) 90 mcg/act inhaler, Inhale 2 puffs every 6 (six) hours as needed for wheezing, Disp: , Rfl:   •  aspirin (ECOTRIN LOW STRENGTH) 81 mg EC tablet, Take 81 mg by mouth daily, Disp: , Rfl:   •  atorvastatin (LIPITOR) 80 mg tablet, Take 80 mg by mouth daily, Disp: , Rfl:   •  Benzoyl Peroxide 5 % lotion, APPLY MODERATE AMOUNT TO AFFECTED AREA TWICE A DAY FOR ACNE. LATHER ON BEARD AREA, LEAVE ON FOR 5 MINUTES THEN RINSE, Disp: , Rfl:   •  brimonidine tartrate 0.2 % ophthalmic solution, 1 drop 3 (three) times a day, Disp: , Rfl:   •  Brinzolamide-Brimonidine 1-0.2 % SUSP, INSTILL ONE DROP IN EACH EYE TWICE A DAY, Disp: , Rfl:   •  buPROPion (WELLBUTRIN XL) 300 mg 24 hr tablet, Take 300 mg by mouth daily, Disp: , Rfl:   •  celecoxib (CeleBREX) 200 mg capsule, TAKE 1 CAPSULE BY MOUTH DAILY, AS NEEDED FOR PAIN, MODERATE,INSTR:DO NOT TAKE OTHER NSAIDS, Disp: , Rfl:   •  chlorthalidone 25 mg tablet, Take 12.5 mg by mouth daily, Disp: , Rfl:   •  Cholecalciferol 50 MCG (2000 UT) CAPS, Take 1 capsule by mouth, Disp: , Rfl:   •  Diclofenac Sodium (VOLTAREN) 1 %, Apply 2 g topically 4 (four) times a day, Disp: , Rfl:   •  DULoxetine (CYMBALTA) 20 mg capsule, Take 20 mg by mouth daily, Disp: , Rfl:   •  latanoprost (XALATAN) 0.005 % ophthalmic solution, 1 drop daily at bedtime, Disp: , Rfl:   •  Multiple Vitamins-Minerals (multivitamin with minerals) tablet, Take 1 tablet by mouth daily, Disp: , Rfl:   •  naproxen (NAPROSYN) 375 mg tablet, Take 375 mg by mouth, Disp: , Rfl:   •  Omega-3 Fatty Acids (Fish Oil) 1360 MG CAPS, Take 2,400 mg by mouth, Disp: , Rfl:   •  sildenafil (VIAGRA) 100 mg tablet, Take 100 mg by mouth daily as needed for erectile dysfunction, Disp: , Rfl:   •  tiZANidine (ZANAFLEX) 4 mg tablet, Take 1 tablet (4 mg total) by mouth daily at  "bedtime, Disp: 30 tablet, Rfl: 2  •  Turmeric 500 MG CAPS, Take by mouth, Disp: , Rfl:   •  vitamin E, tocopherol, 200 units capsule, Take 200 Units by mouth daily, Disp: , Rfl:   •  predniSONE 10 mg tablet, Take according to titration schedule (Patient not taking: Reported on 11/10/2023), Disp: 36 tablet, Rfl: 0  •  QUEtiapine (SEROquel) 25 mg tablet, Take 25 mg by mouth (Patient not taking: Reported on 1/22/2024), Disp: , Rfl:   •  rosuvastatin (CRESTOR) 40 MG tablet, Take 40 mg by mouth daily (Patient not taking: Reported on 1/22/2024), Disp: , Rfl:   •  venlafaxine (EFFEXOR) 75 mg tablet, Take 75 mg by mouth 2 (two) times a day (Patient not taking: Reported on 4/19/2024), Disp: , Rfl:     Allergies   Allergen Reactions   • Benzodiazepines Rash     Agitation    Mental status change       Physical Exam:    /80 (BP Location: Left arm, Patient Position: Sitting, Cuff Size: Large)   Pulse 82   Temp (!) 97.2 °F (36.2 °C)   Ht 6' 2.5\" (1.892 m)   Wt 98.9 kg (218 lb)   BMI 27.62 kg/m²     Constitutional:normal, well developed, well nourished, alert, in no distress and non-toxic and no overt pain behavior.  Eyes:anicteric  HEENT:grossly intact  Neck:supple, symmetric, trachea midline and no masses   Pulmonary:even and unlabored  Cardiovascular:No edema or pitting edema present  Skin:Normal without rashes or lesions and well hydrated  Psychiatric:Mood and affect appropriate  Neurologic:Cranial Nerves II-XII grossly intact  Musculoskeletal: Gait is antalgic without the use of assistive devices, tenderness to palpation over the bilateral paraspinal muscles of the lumbar spine, limited range of motion with flexion and extension, positive straight leg raise test right-sided seated position.  Right knee tender to palpation over the medial aspect and crepitus with range of motion.  Left foot tenderness to palpation along the plantar aspect of the metatarsophalangeal joints.      Imaging  MRI knee right without " contrast    (Results Pending)   MRI lumbar spine without contrast    (Results Pending)         Orders Placed This Encounter   Procedures   • MRI knee right without contrast   • MRI lumbar spine without contrast

## 2024-04-23 ENCOUNTER — TELEPHONE (OUTPATIENT)
Dept: PAIN MEDICINE | Facility: CLINIC | Age: 69
End: 2024-04-23

## 2024-04-23 NOTE — TELEPHONE ENCOUNTER
----- Message from Ijeoma Fischer PA-C sent at 4/22/2024  3:37 PM EDT -----  Please let patient know his left foot xray shows mild arthritis at the big toe region.   He can consult with podiatry if he is still having foot pain.

## 2024-04-24 ENCOUNTER — TELEPHONE (OUTPATIENT)
Age: 69
End: 2024-04-24

## 2024-04-24 NOTE — TELEPHONE ENCOUNTER
S/w pt, stated that he is scheduled for an open mri of his knee and his lumbar spine at [facility] on 4/30/24. Pt stated that he will still be in a tube - but will be able to see out. Requesting something to help him stay calm. Per pt, he is not familiar with xanax or ativan.     Advised pt, the writer will d/w MG and cb to advise. The writer will also fu with the prior auth dept re: these mri's. Pt stated that he is 100% disabled and the veterans will be paying for these in full. Advised pt, the writer cannot speak to the oop expense however, a prior auth is generally required. Pt verbalized understanding and appreciation.

## 2024-04-24 NOTE — TELEPHONE ENCOUNTER
Caller: Raheel     Doctor: Walter    Reason for call: Patient called stating he needed to get MRI but is not able to do it in regular machine. I did advise of open MRI in [place] provided ph# and patient will call back to give appt date when patient calls back please fax [phone] order to [facility]     Call back#: [phone]

## 2024-04-24 NOTE — TELEPHONE ENCOUNTER
Caller: Raheel    Doctor: Amado    Reason for call: patient needs medication that last an hour that is how long the MRI will take if not he will be full of anxiety he's getting 2 done on 4/30     [id number] - [place], [place] - [address] [place] [address], [place] [address]  Phone: [phone]  Fax: [phone]  LUCIO #: [id number]     Call back#: [phone]

## 2024-04-25 DIAGNOSIS — F41.9 ANXIETY: Primary | ICD-10-CM

## 2024-04-25 NOTE — TELEPHONE ENCOUNTER
Caller: Raheel    Doctor: Amado    Reason for call: patient calling back with fax # [phone] for the  information they need order so they can schedule MRI     Call back#: [phone]

## 2024-04-25 NOTE — TELEPHONE ENCOUNTER
Jen Sepulveda RN2 days ago     BS  ----- Message from Ijeoma Fischer PA-C sent at 4/22/2024  3:37 PM EDT -----  Please let patient know his left foot xray shows mild arthritis at the big toe region.   He can consult with podiatry if he is still having foot pain.             Note

## 2024-04-25 NOTE — TELEPHONE ENCOUNTER
Caller: Patient     Doctor: Amado     Reason for call: Patient would like his MRI order and new medication info to be faxed to his PCP Kathi Larson . Her fax # is : [phone]     Call back#: [phone]

## 2024-04-25 NOTE — TELEPHONE ENCOUNTER
I see he has a benzodiazepine allergy on his chart so I wouldn't be able to prescribe him anything to take prior to the MRI.

## 2024-04-26 NOTE — TELEPHONE ENCOUNTER
S/w pt, advised of benzo allergy. No rx sent. Pt expressed concern s/t MRI. Advised pt that it is an open air MRI, suggested contacting the facility to discuss headphones, and measures available to help with anxiety.     Advised pt that the mri order has been faxed to his doctor as discussed.     Reviewed x-ray findings per MG. Pt verbalized understanding and appreciation. Will fu with podiatry on Monday as scheduled.

## 2024-04-26 NOTE — TELEPHONE ENCOUNTER
Caller: karen    Doctor: yanely    Reason for call: pt is calling back about xray.    Call back#: [phone]

## 2024-04-29 NOTE — TELEPHONE ENCOUNTER
Caller: Raheel     Doctor: Walter    Reason for call: Patient calling requesting [phone]    Call back#: [phone]

## 2024-04-29 NOTE — PROGRESS NOTES
Espinoza Lal M.D. Attending, Orthopaedic Surgery  Foot and [facility]        ORTHOPAEDIC FOOT AND ANKLE CLINIC VISIT     Assessment:     Encounter Diagnoses   Name Primary? Right ankle pain, unspecified chronicity     Sprain of anterior talofibular ligament of right ankle, sequela Yes              Plan:   The patient verbalized understanding of exam findings and treatment plan. We engaged in the shared decision-making process and treatment options were discussed at length with the patient. Surgical and conservative management discussed today along with risks and benefits. Underrehabilitated right ankle after fracture 20 years ago  He has significant weakness in all 4 planes in his right ankle when compared to his left ankle, TTP over his lateral ligaments, negative anterior drawer. Plan for physical therapy for strengthening and stability 2x per week for 6 weeks  If no improvement will consider MRI  Return in about 7 weeks (around 12/29/2023) for Recheck. History of Present Illness:   Chief Complaint:   Chief Complaint   Patient presents with    Right Ankle - Pain     Jeff Hernandez is a 79 y.o. male who is being seen for right ankle pain. Patient states he had a  accident 20 years ago and fractured his ankle but it was treated non-operatively.j He now has been using a Rolator to walk. Pain is localized at around the subfibular area of his ankle with minimal radiating and described as sharp and severe. Patient denies numbness, tingling or radicular pain. Denies history of neuropathy. Patient does  smoke around 1 cigarette per day and smokes Deepika Batters, does not have diabetes and does not take blood thinners. Patient denies family history of anesthesia complications and has not had any complications with anesthesia.      Pain/symptom timing:  Worse during the day when active  Pain/symptom context:  Worse with activites and work  Pain/symptom modifying factors: Rest makes better, activities make worse  Pain/symptom associated signs/symptoms: none    Prior treatment   NSAIDsYes    Injections No   Bracing/Orthotics Yes   Physical Therapy Yes     Orthopedic Surgical History:   R MARLENE in 2007 at [facility]    Past Medical, Surgical and Social History:  Past Medical History:  has a past medical history of Anxiety. Problem List: does not have any pertinent problems on file. Past Surgical History:  has a past surgical history that includes Hernia repair. Family History: family history is not on file. Social History:  reports that he has been smoking cigarettes. He has been smoking an average of .25 packs per day. He has never used smokeless tobacco. He reports current alcohol use. He reports current drug use. Drug: Marijuana. Current Medications: has a current medication list which includes the following prescription(s): acetaminophen, albuterol, aspirin, atorvastatin, brimonidine tartrate, bupropion, cholecalciferol, duloxetine, latanoprost, multivitamin with minerals, naproxen, fish oil, quetiapine, rosuvastatin, sildenafil, turmeric, venlafaxine, vitamin e (tocopherol), chlorthalidone, and prednisone. Allergies: has No Known Allergies.      Review of Systems:  General- denies fever/chills  HEENT- denies hearing loss or sore throat  Eyes- denies eye pain or visual disturbances, denies red eyes  Respiratory- denies cough or SOB  Cardio- denies chest pain or palpitations  GI- denies abdominal pain  Endocrine- denies urinary frequency  Urinary- denies pain with urination  Musculoskeletal- Negative except noted above  Skin- denies rashes or wounds  Neurological- denies dizziness or headache  Psychiatric- denies anxiety or difficulty concentrating    Physical Exam:   /90 (BP Location: Left arm, Patient Position: Sitting, Cuff Size: Adult)   Ht 6' 2.5" (1.892 m) Comment: verbal  Wt 102 kg (224 lb)   BMI 28.38 kg/m²   General/Constitutional: No apparent distress: well-nourished and well developed. Eyes: normal ocular motion  Cardio: RRR, Normal S1S2, No m/r/g  Lymphatic: No appreciable lymphadenopathy  Respiratory: Non-labored breathing, CTA b/l no w/c/r  Vascular: No edema, swelling or tenderness, except as noted in detailed exam.  Integumentary: No impressive skin lesions present, except as noted in detailed exam.  Neuro: No ataxia or tremors noted  Psych: Normal mood and affect, oriented to person, place and time. Appropriate affect. Musculoskeletal: Normal, except as noted in detailed exam and in HPI. Examination    Right    Gait Antalgic   Musculoskeletal Tender to palpation at the anterolateral ankle    Skin Normal.      Nails Normal    Range of Motion  5 degrees dorsiflexion, 30 degrees plantarflexion  Subtalar motion: 20 degrees inversion and eversion    Stability Stable    Muscle Strength 4/5 tibialis anterior  4/5 gastrocnemius-soleus  4/5 posterior tibialis  4/5 peroneal/eversion strength  4/5 EHL  4/5 FHL    Neurologic Normal    Sensation  Intact to light touch throughout sural, saphenous, superficial peroneal, deep peroneal and medial/lateral plantar nerve distributions. [place]-Karley 5.07 filament (10g) testing  deferred. Cardiovascular Brisk capillary refill < 2 seconds,intact DP and PT pulses    Special Tests Negative anterior drawer. Unable to single leg heel raise. 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 Reviewed by me personally. Lexie Bar. Lachman, MD  Foot & Ankle Surgery   Department of [address] [place] [address] personally performed the service. Lexie Bar. Lachman, MD No adverse reaction to first time med in ED